# Patient Record
Sex: MALE | Race: WHITE | NOT HISPANIC OR LATINO | Employment: FULL TIME | URBAN - METROPOLITAN AREA
[De-identification: names, ages, dates, MRNs, and addresses within clinical notes are randomized per-mention and may not be internally consistent; named-entity substitution may affect disease eponyms.]

---

## 2023-06-09 ENCOUNTER — TELEPHONE (OUTPATIENT)
Dept: PRIMARY CARE | Facility: CLINIC | Age: 43
End: 2023-06-09

## 2023-06-15 DIAGNOSIS — I10 PRIMARY HYPERTENSION: Primary | ICD-10-CM

## 2023-06-15 RX ORDER — AMLODIPINE BESYLATE 10 MG/1
TABLET ORAL
Qty: 90 TABLET | Refills: 0 | Status: SHIPPED | OUTPATIENT
Start: 2023-06-15 | End: 2023-10-20

## 2023-06-15 RX ORDER — LISINOPRIL AND HYDROCHLOROTHIAZIDE 20; 25 MG/1; MG/1
TABLET ORAL
Qty: 90 TABLET | Refills: 0 | Status: SHIPPED | OUTPATIENT
Start: 2023-06-15 | End: 2023-09-18

## 2023-07-07 DIAGNOSIS — E11.65 TYPE 2 DIABETES MELLITUS WITH HYPERGLYCEMIA, WITHOUT LONG-TERM CURRENT USE OF INSULIN (MULTI): Primary | ICD-10-CM

## 2023-07-07 RX ORDER — METFORMIN HYDROCHLORIDE 500 MG/1
TABLET, EXTENDED RELEASE ORAL
Qty: 360 TABLET | Refills: 0 | Status: SHIPPED | OUTPATIENT
Start: 2023-07-07 | End: 2023-10-20

## 2023-07-22 LAB
ALANINE AMINOTRANSFERASE (SGPT) (U/L) IN SER/PLAS: 12 U/L (ref 10–52)
ALBUMIN (G/DL) IN SER/PLAS: 3.7 G/DL (ref 3.4–5)
ALKALINE PHOSPHATASE (U/L) IN SER/PLAS: 93 U/L (ref 33–120)
ANION GAP IN SER/PLAS: 12 MMOL/L (ref 10–20)
ASPARTATE AMINOTRANSFERASE (SGOT) (U/L) IN SER/PLAS: 10 U/L (ref 9–39)
BASOPHILS (10*3/UL) IN BLOOD BY AUTOMATED COUNT: 0.06 X10E9/L (ref 0–0.1)
BASOPHILS/100 LEUKOCYTES IN BLOOD BY AUTOMATED COUNT: 0.4 % (ref 0–2)
BILIRUBIN TOTAL (MG/DL) IN SER/PLAS: 0.4 MG/DL (ref 0–1.2)
CALCIUM (MG/DL) IN SER/PLAS: 9 MG/DL (ref 8.6–10.3)
CARBON DIOXIDE, TOTAL (MMOL/L) IN SER/PLAS: 29 MMOL/L (ref 21–32)
CHLORIDE (MMOL/L) IN SER/PLAS: 100 MMOL/L (ref 98–107)
CHOLESTEROL (MG/DL) IN SER/PLAS: 131 MG/DL (ref 0–199)
CHOLESTEROL IN HDL (MG/DL) IN SER/PLAS: 38.4 MG/DL
CHOLESTEROL/HDL RATIO: 3.4
CREATININE (MG/DL) IN SER/PLAS: 0.77 MG/DL (ref 0.5–1.3)
EOSINOPHILS (10*3/UL) IN BLOOD BY AUTOMATED COUNT: 0.23 X10E9/L (ref 0–0.7)
EOSINOPHILS/100 LEUKOCYTES IN BLOOD BY AUTOMATED COUNT: 1.7 % (ref 0–6)
ERYTHROCYTE DISTRIBUTION WIDTH (RATIO) BY AUTOMATED COUNT: 14.8 % (ref 11.5–14.5)
ERYTHROCYTE MEAN CORPUSCULAR HEMOGLOBIN CONCENTRATION (G/DL) BY AUTOMATED: 31.3 G/DL (ref 32–36)
ERYTHROCYTE MEAN CORPUSCULAR VOLUME (FL) BY AUTOMATED COUNT: 85 FL (ref 80–100)
ERYTHROCYTES (10*6/UL) IN BLOOD BY AUTOMATED COUNT: 4.5 X10E12/L (ref 4.5–5.9)
GFR MALE: >90 ML/MIN/1.73M2
GLUCOSE (MG/DL) IN SER/PLAS: 102 MG/DL (ref 74–99)
HEMATOCRIT (%) IN BLOOD BY AUTOMATED COUNT: 38.3 % (ref 41–52)
HEMOGLOBIN (G/DL) IN BLOOD: 12 G/DL (ref 13.5–17.5)
IMMATURE GRANULOCYTES/100 LEUKOCYTES IN BLOOD BY AUTOMATED COUNT: 0.6 % (ref 0–0.9)
LDL: 62 MG/DL (ref 0–99)
LEUKOCYTES (10*3/UL) IN BLOOD BY AUTOMATED COUNT: 13.6 X10E9/L (ref 4.4–11.3)
LYMPHOCYTES (10*3/UL) IN BLOOD BY AUTOMATED COUNT: 1.93 X10E9/L (ref 1.2–4.8)
LYMPHOCYTES/100 LEUKOCYTES IN BLOOD BY AUTOMATED COUNT: 14.2 % (ref 13–44)
MONOCYTES (10*3/UL) IN BLOOD BY AUTOMATED COUNT: 0.9 X10E9/L (ref 0.1–1)
MONOCYTES/100 LEUKOCYTES IN BLOOD BY AUTOMATED COUNT: 6.6 % (ref 2–10)
NEUTROPHILS (10*3/UL) IN BLOOD BY AUTOMATED COUNT: 10.43 X10E9/L (ref 1.2–7.7)
NEUTROPHILS/100 LEUKOCYTES IN BLOOD BY AUTOMATED COUNT: 76.5 % (ref 40–80)
PLATELETS (10*3/UL) IN BLOOD AUTOMATED COUNT: 319 X10E9/L (ref 150–450)
POTASSIUM (MMOL/L) IN SER/PLAS: 4.2 MMOL/L (ref 3.5–5.3)
PROTEIN TOTAL: 6.6 G/DL (ref 6.4–8.2)
SODIUM (MMOL/L) IN SER/PLAS: 137 MMOL/L (ref 136–145)
THYROTROPIN (MIU/L) IN SER/PLAS BY DETECTION LIMIT <= 0.05 MIU/L: 1.15 MIU/L (ref 0.44–3.98)
TRIGLYCERIDE (MG/DL) IN SER/PLAS: 153 MG/DL (ref 0–149)
UREA NITROGEN (MG/DL) IN SER/PLAS: 15 MG/DL (ref 6–23)
VLDL: 31 MG/DL (ref 0–40)

## 2023-07-24 LAB
ESTIMATED AVERAGE GLUCOSE FOR HBA1C: 140 MG/DL
HEMOGLOBIN A1C/HEMOGLOBIN TOTAL IN BLOOD: 6.5 %

## 2023-08-02 ENCOUNTER — OFFICE VISIT (OUTPATIENT)
Dept: PRIMARY CARE | Facility: CLINIC | Age: 43
End: 2023-08-02
Payer: COMMERCIAL

## 2023-08-02 VITALS
DIASTOLIC BLOOD PRESSURE: 82 MMHG | WEIGHT: 315 LBS | SYSTOLIC BLOOD PRESSURE: 148 MMHG | BODY MASS INDEX: 40.43 KG/M2 | TEMPERATURE: 98 F | HEIGHT: 74 IN

## 2023-08-02 DIAGNOSIS — R79.89 ABNORMAL CBC: Primary | ICD-10-CM

## 2023-08-02 DIAGNOSIS — I10 PRIMARY HYPERTENSION: ICD-10-CM

## 2023-08-02 DIAGNOSIS — E11.65 TYPE 2 DIABETES MELLITUS WITH HYPERGLYCEMIA, WITHOUT LONG-TERM CURRENT USE OF INSULIN (MULTI): ICD-10-CM

## 2023-08-02 DIAGNOSIS — D72.829 LEUKOCYTOSIS, UNSPECIFIED TYPE: ICD-10-CM

## 2023-08-02 DIAGNOSIS — E78.2 MIXED HYPERLIPIDEMIA: ICD-10-CM

## 2023-08-02 DIAGNOSIS — E66.01 MORBID OBESITY WITH BODY MASS INDEX (BMI) OF 40.0 OR HIGHER (MULTI): ICD-10-CM

## 2023-08-02 PROBLEM — F41.9 ANXIETY AND DEPRESSION: Status: ACTIVE | Noted: 2023-08-02

## 2023-08-02 PROBLEM — R06.83 SNORING: Status: ACTIVE | Noted: 2023-08-02

## 2023-08-02 PROBLEM — D64.9 ANEMIA: Status: ACTIVE | Noted: 2023-08-02

## 2023-08-02 PROBLEM — G47.33 SEVERE OBSTRUCTIVE SLEEP APNEA: Status: ACTIVE | Noted: 2023-08-02

## 2023-08-02 PROBLEM — F32.A ANXIETY AND DEPRESSION: Status: ACTIVE | Noted: 2023-08-02

## 2023-08-02 PROBLEM — R06.89 ABNORMAL BREATH SOUNDS: Status: ACTIVE | Noted: 2023-08-02

## 2023-08-02 PROBLEM — E78.1 HYPERTRIGLYCERIDEMIA: Status: ACTIVE | Noted: 2023-08-02

## 2023-08-02 PROBLEM — M43.6 ACUTE TORTICOLLIS: Status: ACTIVE | Noted: 2023-08-02

## 2023-08-02 PROBLEM — U07.1 COVID-19 VIRUS INFECTION: Status: ACTIVE | Noted: 2023-08-02

## 2023-08-02 PROBLEM — I25.10 CAD (CORONARY ARTERY DISEASE): Status: ACTIVE | Noted: 2023-08-02

## 2023-08-02 PROBLEM — E78.5 HYPERLIPIDEMIA: Status: ACTIVE | Noted: 2023-08-02

## 2023-08-02 PROBLEM — M43.6 ACUTE TORTICOLLIS: Status: RESOLVED | Noted: 2023-08-02 | Resolved: 2023-08-02

## 2023-08-02 PROBLEM — R93.1 AGATSTON CORONARY ARTERY CALCIUM SCORE GREATER THAN 400: Status: ACTIVE | Noted: 2023-08-02

## 2023-08-02 PROBLEM — E55.9 VITAMIN D DEFICIENCY: Status: ACTIVE | Noted: 2023-08-02

## 2023-08-02 PROCEDURE — 3077F SYST BP >= 140 MM HG: CPT | Performed by: NURSE PRACTITIONER

## 2023-08-02 PROCEDURE — 3008F BODY MASS INDEX DOCD: CPT | Performed by: NURSE PRACTITIONER

## 2023-08-02 PROCEDURE — 1036F TOBACCO NON-USER: CPT | Performed by: NURSE PRACTITIONER

## 2023-08-02 PROCEDURE — 3044F HG A1C LEVEL LT 7.0%: CPT | Performed by: NURSE PRACTITIONER

## 2023-08-02 PROCEDURE — 3079F DIAST BP 80-89 MM HG: CPT | Performed by: NURSE PRACTITIONER

## 2023-08-02 PROCEDURE — 99214 OFFICE O/P EST MOD 30 MIN: CPT | Performed by: NURSE PRACTITIONER

## 2023-08-02 RX ORDER — GLIPIZIDE 10 MG/1
10 TABLET, FILM COATED, EXTENDED RELEASE ORAL DAILY
COMMUNITY
End: 2023-11-19

## 2023-08-02 RX ORDER — FERROUS SULFATE 325(65) MG
1 TABLET ORAL DAILY
COMMUNITY
Start: 2021-09-24

## 2023-08-02 RX ORDER — ACETAMINOPHEN 500 MG
1 TABLET ORAL DAILY
COMMUNITY
Start: 2020-12-18

## 2023-08-02 RX ORDER — MELATONIN 3 MG
3 CAPSULE ORAL
COMMUNITY
Start: 2019-05-14

## 2023-08-02 RX ORDER — ESCITALOPRAM OXALATE 10 MG/1
10 TABLET ORAL DAILY
COMMUNITY
End: 2023-12-19

## 2023-08-02 RX ORDER — ATORVASTATIN CALCIUM 80 MG/1
80 TABLET, FILM COATED ORAL NIGHTLY
COMMUNITY
Start: 2023-07-22 | End: 2023-10-24

## 2023-08-02 ASSESSMENT — PATIENT HEALTH QUESTIONNAIRE - PHQ9
1. LITTLE INTEREST OR PLEASURE IN DOING THINGS: NOT AT ALL
SUM OF ALL RESPONSES TO PHQ9 QUESTIONS 1 AND 2: 0
2. FEELING DOWN, DEPRESSED OR HOPELESS: NOT AT ALL

## 2023-08-02 NOTE — PATIENT INSTRUCTIONS
Good to see you today.  Labs reviewed.  Please call for appointment with Hematology [phone]  (Jacquelyn or Lexy)    Plan to follow up with me in 6 months with fasting labs before.  Let me know if you need  anything.

## 2023-08-02 NOTE — PROGRESS NOTES
Subjective   Patient ID: Roby Caldwell is a 42 y.o. male who presents for Follow-up (Review labs).       Recent Results (from the past 1008 hour(s))   Comprehensive Metabolic Panel    Collection Time: 07/22/23  8:44 AM   Result Value Ref Range    Glucose 102 (H) 74 - 99 mg/dL    Sodium 137 136 - 145 mmol/L    Potassium 4.2 3.5 - 5.3 mmol/L    Chloride 100 98 - 107 mmol/L    Bicarbonate 29 21 - 32 mmol/L    Anion Gap 12 10 - 20 mmol/L    Urea Nitrogen 15 6 - 23 mg/dL    Creatinine 0.77 0.50 - 1.30 mg/dL    GFR MALE >90 >90 mL/min/1.73m2    Calcium 9.0 8.6 - 10.3 mg/dL    Albumin 3.7 3.4 - 5.0 g/dL    Alkaline Phosphatase 93 33 - 120 U/L    Total Protein 6.6 6.4 - 8.2 g/dL    AST 10 9 - 39 U/L    Total Bilirubin 0.4 0.0 - 1.2 mg/dL    ALT (SGPT) 12 10 - 52 U/L   Lipid Panel    Collection Time: 07/22/23  8:44 AM   Result Value Ref Range    Cholesterol 131 0 - 199 mg/dL    HDL 38.4 (A) mg/dL    Cholesterol/HDL Ratio 3.4     LDL 62 0 - 99 mg/dL    VLDL 31 0 - 40 mg/dL    Triglycerides 153 (H) 0 - 149 mg/dL   Hemoglobin A1C    Collection Time: 07/22/23  8:44 AM   Result Value Ref Range    Hemoglobin A1C 6.5 (A) %    Estimated Average Glucose 140 MG/DL   CBC and Auto Differential    Collection Time: 07/22/23  8:44 AM   Result Value Ref Range    WBC 13.6 (H) 4.4 - 11.3 x10E9/L    RBC 4.50 4.50 - 5.90 x10E12/L    Hemoglobin 12.0 (L) 13.5 - 17.5 g/dL    Hematocrit 38.3 (L) 41.0 - 52.0 %    MCV 85 80 - 100 fL    MCHC 31.3 (L) 32.0 - 36.0 g/dL    Platelets 319 150 - 450 x10E9/L    RDW 14.8 (H) 11.5 - 14.5 %    Neutrophils % 76.5 40.0 - 80.0 %    Immature Granulocytes %, Automated 0.6 0.0 - 0.9 %    Lymphocytes % 14.2 13.0 - 44.0 %    Monocytes % 6.6 2.0 - 10.0 %    Eosinophils % 1.7 0.0 - 6.0 %    Basophils % 0.4 0.0 - 2.0 %    Neutrophils Absolute 10.43 (H) 1.20 - 7.70 x10E9/L    Lymphocytes Absolute 1.93 1.20 - 4.80 x10E9/L    Monocytes Absolute 0.90 0.10 - 1.00 x10E9/L    Eosinophils Absolute 0.23 0.00 - 0.70 x10E9/L     "Basophils Absolute 0.06 0.00 - 0.10 x10E9/L   TSH with reflex to Free T4 if abnormal    Collection Time: 07/22/23  8:44 AM   Result Value Ref Range    TSH 1.15 0.44 - 3.98 mIU/L           HPI   Saw Cardiologist, Dr. Edouard  had a stress test and  Did fine with the stress test.  Packing lunch rather than eating out as much.  Drinking more water and more juice, less pop.  Using BiPAP regularly and thinks it helps.  Has not seen Hematology.        Review of Systems   All other systems reviewed and are negative.          Objective   /82   Temp 36.7 °C (98 °F)   Ht 1.867 m (6' 1.5\")   Wt (!) 174 kg (384 lb)   BMI 49.98 kg/m²     Physical Exam  Vitals and nursing note reviewed.   Constitutional:       Appearance: He is obese.   HENT:      Head: Normocephalic and atraumatic.      Right Ear: Tympanic membrane, ear canal and external ear normal.      Left Ear: Tympanic membrane, ear canal and external ear normal.      Mouth/Throat:      Pharynx: Oropharynx is clear.   Neck:      Thyroid: No thyroid mass, thyromegaly or thyroid tenderness.   Cardiovascular:      Rate and Rhythm: Normal rate and regular rhythm.      Pulses: Normal pulses.      Heart sounds: Normal heart sounds.   Pulmonary:      Effort: Pulmonary effort is normal.      Breath sounds: Normal breath sounds.   Abdominal:      General: Bowel sounds are normal.      Palpations: Abdomen is soft.   Musculoskeletal:      Cervical back: Normal range of motion and neck supple.   Skin:     General: Skin is warm.   Neurological:      Mental Status: He is alert and oriented to person, place, and time. Mental status is at baseline.   Psychiatric:         Mood and Affect: Mood normal.         Assessment/Plan   Problem List Items Addressed This Visit       Type 2 diabetes mellitus with hyperglycemia, without long-term current use of insulin (CMS/[facility])    Relevant Orders    Hemoglobin A1C    Comprehensive Metabolic Panel    Morbid obesity with body mass index (BMI) of " 40.0 or higher (CMS/HCC)    Leucocytosis    Hypertension    Hyperlipidemia    Relevant Orders    Lipid Panel    Abnormal CBC - Primary    Relevant Orders    Referral to Hematology    CBC and Auto Differential            Veronica Sumner, APRN-CNP

## 2023-09-16 DIAGNOSIS — I10 PRIMARY HYPERTENSION: ICD-10-CM

## 2023-09-18 RX ORDER — LISINOPRIL AND HYDROCHLOROTHIAZIDE 20; 25 MG/1; MG/1
TABLET ORAL
Qty: 90 TABLET | Refills: 1 | Status: SHIPPED | OUTPATIENT
Start: 2023-09-18 | End: 2024-04-16

## 2023-10-18 DIAGNOSIS — E78.2 MIXED HYPERLIPIDEMIA: ICD-10-CM

## 2023-10-18 DIAGNOSIS — I25.10 CORONARY ARTERY DISEASE INVOLVING NATIVE CORONARY ARTERY OF NATIVE HEART, UNSPECIFIED WHETHER ANGINA PRESENT: Primary | ICD-10-CM

## 2023-10-18 DIAGNOSIS — I10 PRIMARY HYPERTENSION: ICD-10-CM

## 2023-10-19 DIAGNOSIS — E11.65 TYPE 2 DIABETES MELLITUS WITH HYPERGLYCEMIA, WITHOUT LONG-TERM CURRENT USE OF INSULIN (MULTI): ICD-10-CM

## 2023-10-20 RX ORDER — AMLODIPINE BESYLATE 10 MG/1
TABLET ORAL
Qty: 90 TABLET | Refills: 0 | Status: SHIPPED | OUTPATIENT
Start: 2023-10-20 | End: 2024-01-19

## 2023-10-20 RX ORDER — METFORMIN HYDROCHLORIDE 500 MG/1
1000 TABLET, EXTENDED RELEASE ORAL
Qty: 360 TABLET | Refills: 0 | Status: SHIPPED | OUTPATIENT
Start: 2023-10-20 | End: 2024-01-19

## 2023-10-24 RX ORDER — ATORVASTATIN CALCIUM 80 MG/1
80 TABLET, FILM COATED ORAL NIGHTLY
Qty: 90 TABLET | Refills: 1 | Status: SHIPPED | OUTPATIENT
Start: 2023-10-24 | End: 2024-04-21

## 2023-11-17 DIAGNOSIS — E11.65 TYPE 2 DIABETES MELLITUS WITH HYPERGLYCEMIA, WITHOUT LONG-TERM CURRENT USE OF INSULIN (MULTI): Primary | ICD-10-CM

## 2023-11-19 RX ORDER — GLIPIZIDE 10 MG/1
TABLET, FILM COATED, EXTENDED RELEASE ORAL
Qty: 180 TABLET | Refills: 0 | Status: SHIPPED | OUTPATIENT
Start: 2023-11-19 | End: 2024-04-16

## 2023-11-30 ENCOUNTER — OFFICE VISIT (OUTPATIENT)
Dept: HEMATOLOGY/ONCOLOGY | Facility: CLINIC | Age: 43
End: 2023-11-30
Payer: COMMERCIAL

## 2023-11-30 VITALS
BODY MASS INDEX: 41.75 KG/M2 | SYSTOLIC BLOOD PRESSURE: 130 MMHG | HEART RATE: 92 BPM | RESPIRATION RATE: 16 BRPM | TEMPERATURE: 98.4 F | DIASTOLIC BLOOD PRESSURE: 89 MMHG | HEIGHT: 73 IN | WEIGHT: 315 LBS | OXYGEN SATURATION: 98 %

## 2023-11-30 DIAGNOSIS — D72.829 LEUKOCYTOSIS, UNSPECIFIED TYPE: Primary | ICD-10-CM

## 2023-11-30 DIAGNOSIS — D64.9 ANEMIA, UNSPECIFIED TYPE: ICD-10-CM

## 2023-11-30 LAB
ALBUMIN SERPL BCP-MCNC: 4 G/DL (ref 3.4–5)
ALP SERPL-CCNC: 102 U/L (ref 33–120)
ALT SERPL W P-5'-P-CCNC: 12 U/L (ref 10–52)
ANION GAP SERPL CALC-SCNC: 11 MMOL/L (ref 10–20)
AST SERPL W P-5'-P-CCNC: 10 U/L (ref 9–39)
BASOPHILS # BLD AUTO: 0.04 X10*3/UL (ref 0–0.1)
BASOPHILS NFR BLD AUTO: 0.3 %
BILIRUB SERPL-MCNC: 0.5 MG/DL (ref 0–1.2)
BUN SERPL-MCNC: 13 MG/DL (ref 6–23)
CALCIUM SERPL-MCNC: 9.5 MG/DL (ref 8.6–10.3)
CHLORIDE SERPL-SCNC: 99 MMOL/L (ref 98–107)
CO2 SERPL-SCNC: 30 MMOL/L (ref 21–32)
CREAT SERPL-MCNC: 0.87 MG/DL (ref 0.5–1.3)
EOSINOPHIL # BLD AUTO: 0.21 X10*3/UL (ref 0–0.7)
EOSINOPHIL NFR BLD AUTO: 1.6 %
ERYTHROCYTE [DISTWIDTH] IN BLOOD BY AUTOMATED COUNT: 14.3 % (ref 11.5–14.5)
ERYTHROCYTE [SEDIMENTATION RATE] IN BLOOD BY WESTERGREN METHOD: 32 MM/H (ref 0–15)
FOLATE SERPL-MCNC: 20.2 NG/ML
GFR SERPL CREATININE-BSD FRML MDRD: >90 ML/MIN/1.73M*2
GLUCOSE SERPL-MCNC: 113 MG/DL (ref 74–99)
HCT VFR BLD AUTO: 40.6 % (ref 41–52)
HGB BLD-MCNC: 12.9 G/DL (ref 13.5–17.5)
IMM GRANULOCYTES # BLD AUTO: 0.07 X10*3/UL (ref 0–0.7)
IMM GRANULOCYTES NFR BLD AUTO: 0.5 % (ref 0–0.9)
IRON SATN MFR SERPL: 14 % (ref 25–45)
IRON SERPL-MCNC: 46 UG/DL (ref 35–150)
LYMPHOCYTES # BLD AUTO: 1.87 X10*3/UL (ref 1.2–4.8)
LYMPHOCYTES NFR BLD AUTO: 14.4 %
MCH RBC QN AUTO: 26.3 PG (ref 26–34)
MCHC RBC AUTO-ENTMCNC: 31.8 G/DL (ref 32–36)
MCV RBC AUTO: 83 FL (ref 80–100)
MONOCYTES # BLD AUTO: 0.77 X10*3/UL (ref 0.1–1)
MONOCYTES NFR BLD AUTO: 5.9 %
NEUTROPHILS # BLD AUTO: 10.02 X10*3/UL (ref 1.2–7.7)
NEUTROPHILS NFR BLD AUTO: 77.3 %
PLATELET # BLD AUTO: 317 X10*3/UL (ref 150–450)
POTASSIUM SERPL-SCNC: 4.2 MMOL/L (ref 3.5–5.3)
PROT SERPL-MCNC: 8.2 G/DL (ref 6.4–8.2)
RBC # BLD AUTO: 4.91 X10*6/UL (ref 4.5–5.9)
SODIUM SERPL-SCNC: 136 MMOL/L (ref 136–145)
TIBC SERPL-MCNC: 318 UG/DL (ref 240–445)
UIBC SERPL-MCNC: 272 UG/DL (ref 110–370)
VIT B12 SERPL-MCNC: 341 PG/ML (ref 211–911)
WBC # BLD AUTO: 13 X10*3/UL (ref 4.4–11.3)

## 2023-11-30 PROCEDURE — 3079F DIAST BP 80-89 MM HG: CPT | Performed by: INTERNAL MEDICINE

## 2023-11-30 PROCEDURE — 85025 COMPLETE CBC W/AUTO DIFF WBC: CPT | Performed by: INTERNAL MEDICINE

## 2023-11-30 PROCEDURE — 83540 ASSAY OF IRON: CPT | Performed by: INTERNAL MEDICINE

## 2023-11-30 PROCEDURE — 3044F HG A1C LEVEL LT 7.0%: CPT | Performed by: INTERNAL MEDICINE

## 2023-11-30 PROCEDURE — 99215 OFFICE O/P EST HI 40 MIN: CPT | Performed by: INTERNAL MEDICINE

## 2023-11-30 PROCEDURE — 88275 CYTOGENETICS 100-300: CPT | Performed by: INTERNAL MEDICINE

## 2023-11-30 PROCEDURE — 82607 VITAMIN B-12: CPT | Performed by: INTERNAL MEDICINE

## 2023-11-30 PROCEDURE — 86038 ANTINUCLEAR ANTIBODIES: CPT | Performed by: INTERNAL MEDICINE

## 2023-11-30 PROCEDURE — 3075F SYST BP GE 130 - 139MM HG: CPT | Performed by: INTERNAL MEDICINE

## 2023-11-30 PROCEDURE — 80053 COMPREHEN METABOLIC PANEL: CPT | Performed by: INTERNAL MEDICINE

## 2023-11-30 PROCEDURE — 85652 RBC SED RATE AUTOMATED: CPT | Performed by: INTERNAL MEDICINE

## 2023-11-30 PROCEDURE — 1036F TOBACCO NON-USER: CPT | Performed by: INTERNAL MEDICINE

## 2023-11-30 PROCEDURE — 36415 COLL VENOUS BLD VENIPUNCTURE: CPT | Performed by: INTERNAL MEDICINE

## 2023-11-30 PROCEDURE — 88291 CYTO/MOLECULAR REPORT: CPT | Performed by: INTERNAL MEDICINE

## 2023-11-30 PROCEDURE — 3008F BODY MASS INDEX DOCD: CPT | Performed by: INTERNAL MEDICINE

## 2023-11-30 PROCEDURE — 82746 ASSAY OF FOLIC ACID SERUM: CPT | Performed by: INTERNAL MEDICINE

## 2023-11-30 ASSESSMENT — PATIENT HEALTH QUESTIONNAIRE - PHQ9
SUM OF ALL RESPONSES TO PHQ9 QUESTIONS 1 AND 2: 0
2. FEELING DOWN, DEPRESSED OR HOPELESS: NOT AT ALL
1. LITTLE INTEREST OR PLEASURE IN DOING THINGS: NOT AT ALL

## 2023-11-30 ASSESSMENT — COLUMBIA-SUICIDE SEVERITY RATING SCALE - C-SSRS
1. IN THE PAST MONTH, HAVE YOU WISHED YOU WERE DEAD OR WISHED YOU COULD GO TO SLEEP AND NOT WAKE UP?: NO
6. HAVE YOU EVER DONE ANYTHING, STARTED TO DO ANYTHING, OR PREPARED TO DO ANYTHING TO END YOUR LIFE?: NO
2. HAVE YOU ACTUALLY HAD ANY THOUGHTS OF KILLING YOURSELF?: NO

## 2023-11-30 ASSESSMENT — PAIN SCALES - GENERAL: PAINLEVEL: 0-NO PAIN

## 2023-11-30 NOTE — PATIENT INSTRUCTIONS
Follow up with Dr. Crowder in 4 months.     Lab appointments are no longer scheduled. Please arrive at least 15 minutes before scheduled appointment time for blood work.

## 2023-12-03 LAB — ANA SER QL HEP2 SUBST: NEGATIVE

## 2023-12-06 LAB
CHROM ANALY OVERALL INTERP-IMP: NORMAL
ELECTRONICALLY COSIGNED BY CYTOGENETICS: NORMAL
ELECTRONICALLY SIGNED BY CYTOGENETICS: NORMAL
STRUCT VAR ISCN NAME: NORMAL

## 2023-12-19 DIAGNOSIS — F34.1 PERSISTENT DEPRESSIVE DISORDER: Primary | ICD-10-CM

## 2023-12-19 RX ORDER — ESCITALOPRAM OXALATE 10 MG/1
TABLET ORAL
Qty: 90 TABLET | Refills: 0 | Status: SHIPPED | OUTPATIENT
Start: 2023-12-19 | End: 2024-03-22

## 2024-01-17 DIAGNOSIS — I10 PRIMARY HYPERTENSION: ICD-10-CM

## 2024-01-17 DIAGNOSIS — E11.65 TYPE 2 DIABETES MELLITUS WITH HYPERGLYCEMIA, WITHOUT LONG-TERM CURRENT USE OF INSULIN (MULTI): ICD-10-CM

## 2024-01-19 DIAGNOSIS — I10 PRIMARY HYPERTENSION: ICD-10-CM

## 2024-01-19 RX ORDER — AMLODIPINE BESYLATE 10 MG/1
TABLET ORAL
Qty: 90 TABLET | Refills: 0 | Status: SHIPPED | OUTPATIENT
Start: 2024-01-19 | End: 2024-01-23

## 2024-01-19 RX ORDER — METFORMIN HYDROCHLORIDE 500 MG/1
1000 TABLET, EXTENDED RELEASE ORAL
Qty: 360 TABLET | Refills: 0 | Status: SHIPPED | OUTPATIENT
Start: 2024-01-19

## 2024-01-23 RX ORDER — AMLODIPINE BESYLATE 10 MG/1
TABLET ORAL
Qty: 90 TABLET | Refills: 0 | Status: SHIPPED | OUTPATIENT
Start: 2024-01-23

## 2024-02-02 ENCOUNTER — OFFICE VISIT (OUTPATIENT)
Dept: PRIMARY CARE | Facility: CLINIC | Age: 44
End: 2024-02-02
Payer: COMMERCIAL

## 2024-02-02 VITALS
HEART RATE: 96 BPM | SYSTOLIC BLOOD PRESSURE: 118 MMHG | TEMPERATURE: 97.7 F | DIASTOLIC BLOOD PRESSURE: 76 MMHG | WEIGHT: 315 LBS | BODY MASS INDEX: 52.88 KG/M2

## 2024-02-02 DIAGNOSIS — E11.65 TYPE 2 DIABETES MELLITUS WITH HYPERGLYCEMIA, WITHOUT LONG-TERM CURRENT USE OF INSULIN (MULTI): ICD-10-CM

## 2024-02-02 DIAGNOSIS — E66.01 CLASS 3 SEVERE OBESITY DUE TO EXCESS CALORIES WITH SERIOUS COMORBIDITY AND BODY MASS INDEX (BMI) OF 50.0 TO 59.9 IN ADULT (MULTI): ICD-10-CM

## 2024-02-02 DIAGNOSIS — E78.2 MIXED HYPERLIPIDEMIA: ICD-10-CM

## 2024-02-02 DIAGNOSIS — I10 PRIMARY HYPERTENSION: Primary | ICD-10-CM

## 2024-02-02 PROCEDURE — 99214 OFFICE O/P EST MOD 30 MIN: CPT | Performed by: NURSE PRACTITIONER

## 2024-02-02 PROCEDURE — 3074F SYST BP LT 130 MM HG: CPT | Performed by: NURSE PRACTITIONER

## 2024-02-02 PROCEDURE — 1036F TOBACCO NON-USER: CPT | Performed by: NURSE PRACTITIONER

## 2024-02-02 PROCEDURE — 3078F DIAST BP <80 MM HG: CPT | Performed by: NURSE PRACTITIONER

## 2024-02-02 PROCEDURE — 3008F BODY MASS INDEX DOCD: CPT | Performed by: NURSE PRACTITIONER

## 2024-02-02 NOTE — PROGRESS NOTES
Subjective   Patient ID: Roby Caldwell is a 43 y.o. male who presents for Follow-up (6 month).    HPI   Was not sleeping well and so took low dose Melatonin  .3 and this helps with better sleep.  Did not do labs before today.  Has some early morning shifts.  Not checking Blood sugar at all.  [place] Instant Breakfast   Normal lunch and regular dinner  Eating a little more protein  Has eaten today.  Denies any side effects to medications.  Has not had symptoms of elevated glucose.    Seeing Cardiology and Hematology    Review of Systems   Constitutional:  Positive for unexpected weight change.   Endocrine: Negative for polydipsia, polyphagia and polyuria.   All other systems reviewed and are negative.      Objective   /76   Pulse 96   Temp 36.5 °C (97.7 °F)   Wt (!) 180 kg (396 lb 6.4 oz)   BMI 52.88 kg/m²     Physical Exam  Vitals and nursing note reviewed.   Constitutional:       Appearance: He is obese.   HENT:      Head: Normocephalic and atraumatic.      Right Ear: Tympanic membrane, ear canal and external ear normal.      Left Ear: Tympanic membrane, ear canal and external ear normal.      Mouth/Throat:      Pharynx: Oropharynx is clear.   Neck:      Thyroid: No thyroid mass, thyromegaly or thyroid tenderness.   Cardiovascular:      Rate and Rhythm: Normal rate and regular rhythm.      Pulses: Normal pulses.      Heart sounds: Normal heart sounds.   Pulmonary:      Effort: Pulmonary effort is normal.      Breath sounds: Normal breath sounds.   Abdominal:      General: Bowel sounds are normal.      Palpations: Abdomen is soft.   Skin:     General: Skin is warm.   Neurological:      Mental Status: He is alert and oriented to person, place, and time.   Psychiatric:         Behavior: Behavior normal.      Comments: Anxious mood         Assessment/Plan   Problem List Items Addressed This Visit             ICD-10-CM    Class 3 severe obesity due to excess calories with serious comorbidity and body  mass index (BMI) of 50.0 to 59.9 in adult (CMS/[facility]) E66.01, Z68.43    Hyperlipidemia E78.5    Hypertension - Primary I10    Type 2 diabetes mellitus with hyperglycemia, without long-term current use of insulin (CMS/[facility]) E11.65

## 2024-02-05 ENCOUNTER — LAB (OUTPATIENT)
Dept: LAB | Facility: LAB | Age: 44
End: 2024-02-05
Payer: COMMERCIAL

## 2024-02-05 DIAGNOSIS — R79.89 ABNORMAL CBC: ICD-10-CM

## 2024-02-05 DIAGNOSIS — E11.65 TYPE 2 DIABETES MELLITUS WITH HYPERGLYCEMIA, WITHOUT LONG-TERM CURRENT USE OF INSULIN (MULTI): ICD-10-CM

## 2024-02-05 DIAGNOSIS — E78.2 MIXED HYPERLIPIDEMIA: ICD-10-CM

## 2024-02-05 PROBLEM — I25.10 CORONARY ARTERY CALCIFICATION: Status: ACTIVE | Noted: 2024-02-05

## 2024-02-05 PROBLEM — E66.813 CLASS 3 SEVERE OBESITY DUE TO EXCESS CALORIES WITH SERIOUS COMORBIDITY AND BODY MASS INDEX (BMI) OF 50.0 TO 59.9 IN ADULT: Status: ACTIVE | Noted: 2023-08-02

## 2024-02-05 PROBLEM — R60.0 BILATERAL EDEMA OF LOWER EXTREMITY: Status: ACTIVE | Noted: 2024-02-05

## 2024-02-05 PROBLEM — I25.84 CORONARY ARTERY CALCIFICATION: Status: ACTIVE | Noted: 2024-02-05

## 2024-02-05 LAB
ALBUMIN SERPL BCP-MCNC: 3.5 G/DL (ref 3.4–5)
ALP SERPL-CCNC: 92 U/L (ref 33–120)
ALT SERPL W P-5'-P-CCNC: 12 U/L (ref 10–52)
ANION GAP SERPL CALC-SCNC: 11 MMOL/L (ref 10–20)
AST SERPL W P-5'-P-CCNC: 10 U/L (ref 9–39)
BASOPHILS # BLD AUTO: 0.05 X10*3/UL (ref 0–0.1)
BASOPHILS NFR BLD AUTO: 0.4 %
BILIRUB SERPL-MCNC: 0.4 MG/DL (ref 0–1.2)
BUN SERPL-MCNC: 15 MG/DL (ref 6–23)
CALCIUM SERPL-MCNC: 8.7 MG/DL (ref 8.6–10.3)
CHLORIDE SERPL-SCNC: 102 MMOL/L (ref 98–107)
CHOLEST SERPL-MCNC: 129 MG/DL (ref 0–199)
CHOLESTEROL/HDL RATIO: 3.2
CO2 SERPL-SCNC: 30 MMOL/L (ref 21–32)
CREAT SERPL-MCNC: 0.75 MG/DL (ref 0.5–1.3)
EGFRCR SERPLBLD CKD-EPI 2021: >90 ML/MIN/1.73M*2
EOSINOPHIL # BLD AUTO: 0.2 X10*3/UL (ref 0–0.7)
EOSINOPHIL NFR BLD AUTO: 1.6 %
ERYTHROCYTE [DISTWIDTH] IN BLOOD BY AUTOMATED COUNT: 15.1 % (ref 11.5–14.5)
GLUCOSE SERPL-MCNC: 126 MG/DL (ref 74–99)
HCT VFR BLD AUTO: 37.2 % (ref 41–52)
HDLC SERPL-MCNC: 40.5 MG/DL
HGB BLD-MCNC: 11.5 G/DL (ref 13.5–17.5)
IMM GRANULOCYTES # BLD AUTO: 0.08 X10*3/UL (ref 0–0.7)
IMM GRANULOCYTES NFR BLD AUTO: 0.7 % (ref 0–0.9)
LDLC SERPL CALC-MCNC: 70 MG/DL
LYMPHOCYTES # BLD AUTO: 2.07 X10*3/UL (ref 1.2–4.8)
LYMPHOCYTES NFR BLD AUTO: 16.8 %
MCH RBC QN AUTO: 26.2 PG (ref 26–34)
MCHC RBC AUTO-ENTMCNC: 30.9 G/DL (ref 32–36)
MCV RBC AUTO: 85 FL (ref 80–100)
MONOCYTES # BLD AUTO: 0.79 X10*3/UL (ref 0.1–1)
MONOCYTES NFR BLD AUTO: 6.4 %
NEUTROPHILS # BLD AUTO: 9.11 X10*3/UL (ref 1.2–7.7)
NEUTROPHILS NFR BLD AUTO: 74.1 %
NON HDL CHOLESTEROL: 89 MG/DL (ref 0–149)
NRBC BLD-RTO: 0 /100 WBCS (ref 0–0)
PLATELET # BLD AUTO: 294 X10*3/UL (ref 150–450)
POTASSIUM SERPL-SCNC: 4.2 MMOL/L (ref 3.5–5.3)
PROT SERPL-MCNC: 6.4 G/DL (ref 6.4–8.2)
RBC # BLD AUTO: 4.39 X10*6/UL (ref 4.5–5.9)
SODIUM SERPL-SCNC: 139 MMOL/L (ref 136–145)
TRIGL SERPL-MCNC: 94 MG/DL (ref 0–149)
VLDL: 19 MG/DL (ref 0–40)
WBC # BLD AUTO: 12.3 X10*3/UL (ref 4.4–11.3)

## 2024-02-05 PROCEDURE — 83036 HEMOGLOBIN GLYCOSYLATED A1C: CPT

## 2024-02-05 PROCEDURE — 80053 COMPREHEN METABOLIC PANEL: CPT

## 2024-02-05 PROCEDURE — 80061 LIPID PANEL: CPT

## 2024-02-05 PROCEDURE — 36415 COLL VENOUS BLD VENIPUNCTURE: CPT

## 2024-02-05 PROCEDURE — 85025 COMPLETE CBC W/AUTO DIFF WBC: CPT

## 2024-02-05 ASSESSMENT — ENCOUNTER SYMPTOMS
UNEXPECTED WEIGHT CHANGE: 1
POLYDIPSIA: 0
POLYPHAGIA: 0

## 2024-02-05 NOTE — PATIENT INSTRUCTIONS
Do labs when fasting 8 hours/hydrated and no supplements.  WORK ON WEIGHT SERIOUSLY.  Consider bariatric procedure/Semiglutide  Continue with current medications.  Follow up based on labs.

## 2024-02-05 NOTE — PROGRESS NOTES
[facility]    Patient Name: Roby Caldwell  Patient : 1980      Scribe Attestation  By signing my name below, IViolette Scribe   attest that this documentation has been prepared under the direction and in the presence of Geovanny Edouard DO.      Reason for visit:  This is a 43-year-old male here for follow-up regarding his history of coronary artery calcification, hypertension, dyslipidemia, diabetes mellitus, lower extremity edema, and severe obesity.      HPI:  This is a 43-year-old male here for follow-up regarding his history of coronary artery calcification, hypertension, dyslipidemia, diabetes mellitus, lower extremity edema, and severe obesity.  The patient was last evaluated by me in 2023.  At that visit he was doing reasonably well and asked that he follow-up in 1 year.  CMP done 2024 showed normal serum sodium and potassium with a serum creatinine of 0.75, normal ALT/AST, CBC showed a hemoglobin of 11.5.  Lipid panel done in 2024 showed an LDL cholesterol of 70 and triglycerides of 94 while on atorvastatin 80 mg daily. ECG done today showed sinus rhythm with a heart rate of 92 bpm.  The patient reports that he has been feeling generally well from the cardiac standpoint. He denies any new chest pain, shortness of breath, palpitations and lightheadedness. He states that he does not monitor his blood pressure at home. He states that he takes all of his medications as prescribed. During my exam, he was resting comfortably on the exam table.            Assessment/Plan:   1. Coronary artery calcification  The patient has a history of coronary calcification with a total score of 433.63.   ECG done today showed sinus rhythm with a heart rate of 92 bpm.  He denies anginal chest discomfort.  Blood pressure appears moderately controlled on exam today.  He should continue current antihypertensive medications.  Echocardiogram done  1/23/2023 showed normal left ventricular systolic function with an ejection fraction of 60 to 65%, normal right ventricular systolic function, no significant valve abnormalities.   Recent lab works as noted in the HPI.  Lipid panel done in February 2024 showed an LDL cholesterol of 70 and triglycerides of 94 while on atorvastatin 80 mg daily.   Please see lifestyle recommendations below.  Follow up in 1 year and sooner if necessary.      2. Hypertension  Patient has a history of hypertension which appears moderately controlled on exam today.  He should continue his current antihypertensive medications and monitor his blood pressure at home.     3. Dyslipidemia  Lipid panel done in February 2024 showed an LDL cholesterol of 70 and triglycerides of 94 while on atorvastatin 80 mg daily.   Please see lifestyle recommendations below.     4. Diabetes Mellitus  Hemoglobin A1c done in July 2023 was 6.5%.  Medication management as per PCP.     5. Bilateral lower extremity edema  The patient has trace to 1+  pitting bilateral lower extremity edema on exam today.  I discussed with him the importance of following a low-sodium heart healthy diet as well as weight loss, wearing compression stockings and elevating legs when seated.     6. Severe obesity  Please see lifestyle recommendations below.      7. Obstructive sleep apnea  Stable on new CPAP machine.  Management as per PCP/Sleep Medicine      Order:   Follow-up in 1 year.    Lifestyle Recommendations  I recommend a whole-food plant-based diet, an eating pattern that encourages the consumption of unrefined plant foods (such as fruits, vegetables, tubers, whole grains, legumes, nuts and seeds) and discourages meats, dairy products, eggs and processed foods.     The AHA/ACC recommends that the patient consume a dietary pattern that emphasizes intake of vegetables, fruits, and whole grains; includes low-fat dairy products, poultry, fish, legumes, non-tropical vegetable oils,  and nuts; and limits intake of sodium, sweets, sugar-sweetened beverages, and red meats.  Adapt this dietary pattern to appropriate calorie requirements (a 500-750 kcal/day deficit to loose weight), personal and cultural food preferences, and nutrition therapy for other medical conditions (including diabetes).  Achieve this pattern by following plans such as the [place] Mediterranean, DASH dietary pattern, or AHA diet.     Engage in 2 hours and 30 minutes per week of moderate-intensity physical activity, or 1 hour and 15 minutes (75 minutes) per week of vigorous-intensity aerobic physical activity, or an equivalent combination of moderate and vigorous-intensity aerobic physical activity. Aerobic activity should be performed in episodes of at least 10 minutes preferably spread throughout the week.     Adhering to a heart healthy diet, regular exercise habits, avoidance of tobacco products, and maintenance of a healthy weight are crucial components of their heart disease risk reduction.     Any positive review of systems not specifically addressed in the office visit today should be evaluated and treated by the patients primary care physician or in an emergency department if necessary     Patient was notified that results from ordered tests will be called to the patient if it changes current management; it will otherwise be discussed at a future appointment and available on  [facility].     Thank you for allowing me to participate in the care of this patient.        This document was generated using the assistance of voice recognition software. If there are any errors of spelling, grammar, syntax, or meaning; please feel free to contact me directly for clarification.    Past Medical History:  He has a past medical history of Diabetes mellitus (CMS/HCC), Hypertension, and Unspecified lack of expected normal physiological development in childhood (06/05/2017).    Past Surgical History:  He has a past surgical history that  "includes Cyst Removal; Dental surgery; and LASIK (Bilateral).      Social History:  He reports that he has never smoked. He has never used smokeless tobacco. He reports that he does not drink alcohol and does not use drugs.    Family History:  Family History   Problem Relation Name Age of Onset    Hypothyroidism Mother      Graves' disease Mother      Prostate cancer Father Fabrizio Caldwell     Hypertension Father Fabrizio Caldwell     Cancer Father Fabrizio Caldwell     No Known Problems Brother          Allergies:  Patient has no known allergies.    Outpatient Medications:  Current Outpatient Medications   Medication Instructions    amLODIPine (Norvasc) 10 mg tablet TAKE ONE TABLET BY MOUTH EVERY DAY    atorvastatin (LIPITOR) 80 mg, oral, Nightly    cholecalciferol (Vitamin D-3) 50 mcg (2,000 unit) capsule 1 capsule, oral, Daily    escitalopram (Lexapro) 10 mg tablet TAKE ONE TABLET BY MOUTH EVERY DAY AFTER EATING    ferrous sulfate 325 (65 Fe) MG tablet 1 tablet, oral, Daily    fish oil concentrate (Omega-3) 120-180 mg capsule 1,000 mg, oral    glipiZIDE XL (Glucotrol XL) 10 mg 24 hr tablet TAKE 1 TABLET BY MOUTH DAILY WITH BREAKFAST AND DINNER    lisinopriL-hydrochlorothiazide 20-25 mg tablet TAKE ONE TABLET BY MOUTH DAILY    melatonin 3 mg, oral    metFORMIN XR (GLUCOPHAGE-XR) 1,000 mg, oral, 2 times daily with meals        ROS:  A 14 point review of systems was done and is negative other than as stated in HPI    Vitals:      10/25/2022    10:29 AM 1/5/2023     3:13 PM 1/24/2023     9:56 AM 2/13/2023     2:05 PM 8/2/2023    11:07 AM 11/30/2023    10:44 AM 2/2/2024    11:35 AM   Vitals   Systolic 140 132 132 122 148 130 118   Diastolic 78 74 82 78 82 89 76   Heart Rate  87  91  92 96   Temp   36.8 °C (98.3 °F)  36.7 °C (98 °F) 36.9 °C (98.4 °F) 36.5 °C (97.7 °F)   Resp    18  16    Height (in)  1.829 m (6')  1.829 m (6') 1.867 m (6' 1.5\") 1.844 m (6' 0.6\")     Weight (lb)  385 386 332.25 384 388.01 396.4   BMI  52.22 kg/m2 " 52.35 kg/m2 45.06 kg/m2 49.98 kg/m2 51.76 kg/m2 52.88 kg/m2   BSA (m2)  2.98 m2 2.98 m2 2.77 m2 3 m2 3 m2 3.04 m2       Significant value        Physical Exam:   Constitutional: Cooperative, in no acute distress, alert, appears stated age.   Skin: Skin color, texture, turgor normal. No rashes or lesions.   Head: Normocephalic. No masses, lesions, tenderness or abnormalities   Eyes: Extraocular movements are grossly intact.   Mouth and throat: Mucous membranes moist   Neck: Neck supple, no carotid bruits, no JVD   Respiratory: Lungs clear to auscultation, no wheezing or rhonchi, no use of accessory muscles   Chest wall: No scars, normal excursion with respiration   Cardiovascular: Regular rhythm, no murmur  Gastrointestinal: Abdomen soft, nontender. Bowel sounds normal. Morbidly obese.  Musculoskeletal: Strength equal in upper extremities   Extremities: Trace to 1+ pitting edema   Neurologic: Sensation grossly intact, alert and oriented x3    Intake/Output:   No intake/output data recorded.    Outpatient Medications  Current Outpatient Medications on File Prior to Visit   Medication Sig Dispense Refill    amLODIPine (Norvasc) 10 mg tablet TAKE ONE TABLET BY MOUTH EVERY DAY 90 tablet 0    atorvastatin (Lipitor) 80 mg tablet Take 1 tablet (80 mg) by mouth once daily at bedtime. 90 tablet 1    cholecalciferol (Vitamin D-3) 50 mcg (2,000 unit) capsule Take 1 capsule (50 mcg) by mouth once daily.      escitalopram (Lexapro) 10 mg tablet TAKE ONE TABLET BY MOUTH EVERY DAY AFTER EATING 90 tablet 0    ferrous sulfate 325 (65 Fe) MG tablet Take 1 tablet by mouth once daily.      fish oil concentrate (Omega-3) 120-180 mg capsule Take 1 capsule (1,000 mg) by mouth.      glipiZIDE XL (Glucotrol XL) 10 mg 24 hr tablet TAKE 1 TABLET BY MOUTH DAILY WITH BREAKFAST AND DINNER 180 tablet 0    lisinopriL-hydrochlorothiazide 20-25 mg tablet TAKE ONE TABLET BY MOUTH DAILY 90 tablet 1    melatonin 3 mg capsule Take 3 mg by mouth.       metFORMIN  mg 24 hr tablet take 2 tablets by mouth twice daily with morning and evening meals 360 tablet 0     No current facility-administered medications on file prior to visit.       Labs: (past 26 weeks)  Recent Results (from the past 4368 hour(s))   CBC and Auto Differential    Collection Time: 11/30/23 11:40 AM   Result Value Ref Range    WBC 13.0 (H) 4.4 - 11.3 x10*3/uL    RBC 4.91 4.50 - 5.90 x10*6/uL    Hemoglobin 12.9 (L) 13.5 - 17.5 g/dL    Hematocrit 40.6 (L) 41.0 - 52.0 %    MCV 83 80 - 100 fL    MCH 26.3 26.0 - 34.0 pg    MCHC 31.8 (L) 32.0 - 36.0 g/dL    RDW 14.3 11.5 - 14.5 %    Platelets 317 150 - 450 x10*3/uL    Neutrophils % 77.3 40.0 - 80.0 %    Immature Granulocytes %, Automated 0.5 0.0 - 0.9 %    Lymphocytes % 14.4 13.0 - 44.0 %    Monocytes % 5.9 2.0 - 10.0 %    Eosinophils % 1.6 0.0 - 6.0 %    Basophils % 0.3 0.0 - 2.0 %    Neutrophils Absolute 10.02 (H) 1.20 - 7.70 x10*3/uL    Immature Granulocytes Absolute, Automated 0.07 0.00 - 0.70 x10*3/uL    Lymphocytes Absolute 1.87 1.20 - 4.80 x10*3/uL    Monocytes Absolute 0.77 0.10 - 1.00 x10*3/uL    Eosinophils Absolute 0.21 0.00 - 0.70 x10*3/uL    Basophils Absolute 0.04 0.00 - 0.10 x10*3/uL   Comprehensive Metabolic Panel    Collection Time: 11/30/23 11:40 AM   Result Value Ref Range    Glucose 113 (H) 74 - 99 mg/dL    Sodium 136 136 - 145 mmol/L    Potassium 4.2 3.5 - 5.3 mmol/L    Chloride 99 98 - 107 mmol/L    Bicarbonate 30 21 - 32 mmol/L    Anion Gap 11 10 - 20 mmol/L    Urea Nitrogen 13 6 - 23 mg/dL    Creatinine 0.87 0.50 - 1.30 mg/dL    eGFR >90 >60 mL/min/1.73m*2    Calcium 9.5 8.6 - 10.3 mg/dL    Albumin 4.0 3.4 - 5.0 g/dL    Alkaline Phosphatase 102 33 - 120 U/L    Total Protein 8.2 6.4 - 8.2 g/dL    AST 10 9 - 39 U/L    Bilirubin, Total 0.5 0.0 - 1.2 mg/dL    ALT 12 10 - 52 U/L   Iron and TIBC    Collection Time: 11/30/23 11:40 AM   Result Value Ref Range    Iron 46 35 - 150 ug/dL    UIBC 272 110 - 370 ug/dL    TIBC 318 240 -  445 ug/dL    % Saturation 14 (L) 25 - 45 %   Vitamin B12    Collection Time: 11/30/23 11:40 AM   Result Value Ref Range    Vitamin B12 341 211 - 911 pg/mL   Folate    Collection Time: 11/30/23 11:40 AM   Result Value Ref Range    Folate, Serum 20.2 >5.0 ng/mL   BCR/ABL1, FISH    Collection Time: 11/30/23 11:40 AM   Result Value Ref Range    ISCN       FISH NEGATIVE for BCR::ABL1 rearrangement/translocation    nuc ned(ABL1,BCR)x2[250]      Cytogenetics Interpretation       Results Summary  Fluorescence in situ hybridization (FISH) analysis showed NORMAL results with no evidence of BCR::ABL1 rearrangement/9;22 translocation.    Anomaly Result (%) Reference range (%)   BCR::ABL1 0.0% (0.2% or greater)   METHODOLOGY   The dual color, dual fusion BCR (22q11.2) and ABL1 (9q34) probes ([employer], [place], [place]) were used in this interphase FISH assay. This test should not be used for the detection of minimal residual disease.  Number of nuclei scored: 250  Number of techs: 2    ANALYTE SPECIFIC REAGENT (ASR) DISCLAIMER  This FISH test was developed and its performance characteristics determined by the [facility].  It has not been cleared or approved by the U.S. Food and Drug Administration.  The FDA has determined that such clearance or approval is not necessary.  This test is used for clinical purposes.  It should not be regarded as investigational or for research.  This laboratory is certified under the Clinical Laboratory Improvement Amendments (CLIA) as qualified to perform high complexity laboratory testing.      Electronically signed and reported by       William Hu MD        Electronically co-signed by       Pierre Newman MD       Sedimentation Rate    Collection Time: 11/30/23 11:40 AM   Result Value Ref Range    Sedimentation Rate 32 (H) 0 - 15 mm/h   BARB with Reflex to AKASH    Collection Time: 11/30/23 11:40 AM   Result Value Ref Range    BARB Negative Negative    Comprehensive Metabolic Panel    Collection Time: 02/05/24  8:02 AM   Result Value Ref Range    Glucose 126 (H) 74 - 99 mg/dL    Sodium 139 136 - 145 mmol/L    Potassium 4.2 3.5 - 5.3 mmol/L    Chloride 102 98 - 107 mmol/L    Bicarbonate 30 21 - 32 mmol/L    Anion Gap 11 10 - 20 mmol/L    Urea Nitrogen 15 6 - 23 mg/dL    Creatinine 0.75 0.50 - 1.30 mg/dL    eGFR >90 >60 mL/min/1.73m*2    Calcium 8.7 8.6 - 10.3 mg/dL    Albumin 3.5 3.4 - 5.0 g/dL    Alkaline Phosphatase 92 33 - 120 U/L    Total Protein 6.4 6.4 - 8.2 g/dL    AST 10 9 - 39 U/L    Bilirubin, Total 0.4 0.0 - 1.2 mg/dL    ALT 12 10 - 52 U/L   Lipid Panel    Collection Time: 02/05/24  8:02 AM   Result Value Ref Range    Cholesterol 129 0 - 199 mg/dL    HDL-Cholesterol 40.5 mg/dL    Cholesterol/HDL Ratio 3.2     LDL Calculated 70 <=99 mg/dL    VLDL 19 0 - 40 mg/dL    Triglycerides 94 0 - 149 mg/dL    Non HDL Cholesterol 89 0 - 149 mg/dL   CBC and Auto Differential    Collection Time: 02/05/24  8:02 AM   Result Value Ref Range    WBC 12.3 (H) 4.4 - 11.3 x10*3/uL    nRBC 0.0 0.0 - 0.0 /100 WBCs    RBC 4.39 (L) 4.50 - 5.90 x10*6/uL    Hemoglobin 11.5 (L) 13.5 - 17.5 g/dL    Hematocrit 37.2 (L) 41.0 - 52.0 %    MCV 85 80 - 100 fL    MCH 26.2 26.0 - 34.0 pg    MCHC 30.9 (L) 32.0 - 36.0 g/dL    RDW 15.1 (H) 11.5 - 14.5 %    Platelets 294 150 - 450 x10*3/uL    Neutrophils % 74.1 40.0 - 80.0 %    Immature Granulocytes %, Automated 0.7 0.0 - 0.9 %    Lymphocytes % 16.8 13.0 - 44.0 %    Monocytes % 6.4 2.0 - 10.0 %    Eosinophils % 1.6 0.0 - 6.0 %    Basophils % 0.4 0.0 - 2.0 %    Neutrophils Absolute 9.11 (H) 1.20 - 7.70 x10*3/uL    Immature Granulocytes Absolute, Automated 0.08 0.00 - 0.70 x10*3/uL    Lymphocytes Absolute 2.07 1.20 - 4.80 x10*3/uL    Monocytes Absolute 0.79 0.10 - 1.00 x10*3/uL    Eosinophils Absolute 0.20 0.00 - 0.70 x10*3/uL    Basophils Absolute 0.05 0.00 - 0.10 x10*3/uL       ECG  No results found for this or any previous visit (from the past  4464 hour(s)).    Echocardiogram  No results found for this or any previous visit from the past 1095 days.      CV Studies:  EKG:No results found for this or any previous visit (from the past 4464 hour(s)).  Echocardiogram:   Echocardiogram     [place] [id number]  Phone [phone] Fax [phone]    TRANSTHORACIC ECHOCARDIOGRAM REPORT      Patient Name:     BETINA KATHERINE Carter Physician:  [id number] Maricel BERNAL MD  Study Date:       1/23/2023       Referring           MARISSA ORDONEZ  Physician:  MRN/PID:          [id number]        PCP:  Accession/Order#: [id number]    [facility] Lab  Location:  YOB: 1980       Fellow:  Gender:           M               Nurse:              Lluvia Rincon RN PRN  Admit Date:                       Sonographer:        Liza Brice  Admission Status: Outpatient      Additional Staff:  Height:           182.88 cm       CC Report to:  Weight:           174.63 kg       Study Type:         Echocardiogram  BSA:              2.81 m2  Blood Pressure: 132 /74 mmHg    Diagnosis/ICD: I25.10-Atherosclerotic heart disease of native coronary artery  without angina pectoris; I10-Essential (primary) hypertension  Indication:    Coronary artery calcification, HTN  Procedure/CPT: Echo Complete w Full [id number]  Study Detail: The following Echo studies were performed: 2D, M-Mode, Doppler and  color flow. Technically challenging study due to poor acoustic  windows and body habitus. Optison used as a contrast agent for  endocardial border definition.      PHYSICIAN INTERPRETATION:  Left Ventricle: Left ventricular systolic function is normal, with an estimated ejection fraction of 60-65%. There are no regional wall motion abnormalities. The left ventricular cavity size is normal. Left ventricular diastolic filling was indeterminate.  Left Atrium: The left  atrium is moderately dilated.  Right Ventricle: The right ventricle is normal in size. There is normal right ventricular global systolic function.  Right Atrium: The right atrium is normal in size.  Aortic Valve: The aortic valve was not well visualized. There is no evidence of aortic valve regurgitation. The peak instantaneous gradient of the aortic valve is 12.5 mmHg. The mean gradient of the aortic valve is 7.4 mmHg.  Mitral Valve: The mitral valve is normal in structure. There is no evidence of mitral valve regurgitation.  Tricuspid Valve: The tricuspid valve is structurally normal. There is trace tricuspid regurgitation. The Doppler estimated RVSP is mildly elevated at 39.5 mmHg.  Pulmonic Valve: The pulmonic valve is not well visualized. The pulmonic valve regurgitation was not well visualized.  Pericardium: There is no pericardial effusion noted.  Aorta: The aortic root is normal.      CONCLUSIONS:  1. Left ventricular systolic function is normal with a 60-65% estimated ejection fraction.  2. The left atrium is moderately dilated.  3. Mildly elevated RVSP.    QUANTITATIVE DATA SUMMARY:  2D MEASUREMENTS:  Normal Ranges:  LAs: 3.56 cm (2.7-4.0cm)    LA VOLUME:  Normal Ranges:  LA Vol A4C:        106.0 ml   (22+/-6mL/m2)  LA Vol A2C:        89.8 ml  LA Vol BP:         98.3 ml  LA Vol Index A4C:  37.7 ml/m2  LA Vol Index A2C:  31.9 ml/m2  LA Vol Index BP:   34.9 ml/m2  LA Area A4C:       28.9 cm2  LA Area A2C:       26.4 cm2  LA Major Axis A4C: 6.7 cm  LA Major Axis A2C: 6.6 cm  LA Vol A4C:        98.5 ml  LA Vol A2C:        85.7 ml    RA VOLUME BY A/L METHOD:  Normal Ranges:  RA Area A4C: 18.4 cm2    AORTA MEASUREMENTS:  Normal Ranges:  Asc Ao, d: 3.30 cm (2.1-3.4cm)    LV SYSTOLIC FUNCTION BY 2D PLANIMETRY (MOD):  Normal Ranges:  EF-A4C View: 60.8 % (>=55%)  EF-A2C View: 73.1 %  EF-Biplane:  68.0 %    LV DIASTOLIC FUNCTION:  Normal Ranges:  MV Peak E:    1.15 m/s    (0.7-1.2 m/s)  MV Peak A:    0.71 m/s     (0.42-0.7 m/s)  E/A Ratio:    1.63        (1.0-2.2)  MV e'         0.10 m/s    (>8.0)  MV lateral e' 0.09 m/s  MV medial e'  0.10 m/s  MV A Dur:     139.87 msec  E/e' Ratio:   12.14       (<8.0)    MITRAL VALVE:  Normal Ranges:  MV DT: 158 msec (150-240msec)    AORTIC VALVE:  Normal Ranges:  AoV Vmax:                1.77 m/s  (<=1.7m/s)  AoV Peak P.5 mmHg (<20mmHg)  AoV Mean P.4 mmHg  (1.7-11.5mmHg)  LVOT Max Uriel:            1.60 m/s  (<=1.1m/s)  AoV VTI:                 33.17 cm  (18-25cm)  LVOT VTI:                29.04 cm  LVOT Diameter:           1.91 cm   (1.8-2.4cm)  AoV Area, VTI:           2.51 cm2  (2.5-5.5cm2)  AoV Area,Vmax:           2.59 cm2  (2.5-4.5cm2)  AoV Dimensionless Index: 0.88    RIGHT VENTRICLE:  RV 1   4.0 cm  RV 2   2.8 cm  RV 3   8.0 cm  TAPSE: 30.5 mm  RV s'  0.12 m/s    TRICUSPID VALVE/RVSP:  Normal Ranges:  Peak TR Velocity: 2.47 m/s  RV Syst Pressure: 39.5 mmHg (< 30mmHg)  TV E Vmax:        0.52 m/s  (0.3-0.7m/s)  TV A Vmax:        0.54 m/s  IVC Diam:         2.99 cm  TV e'             0.1 m/s    AORTA:  Asc Ao Diam 3.31 cm      [id number] Maricel Wells MD  Electronically signed on 2023 at 11:41:02 AM         Final     Stress Testing IMGRESULT([id number]):   NM CARDIAC STRESS REST (MYOCARDIAL PERFUSION MIBI) 2023    Narrative  MRN: [id number]  Patient Name: BETINA BERNAL    STUDY:  CARDIAC STRESS/REST INJECTION;  2023 11:35 am    INDICATION:  Elevated calcium score  E78.5: Hyperlipidemia I10: Hypertension  E66.01: Morbid obesity with body mass index (BMI) of 40.0 or higher.    COMPARISON:  None.    ACCESSION NUMBER(S):  [id number]    ORDERING CLINICIAN:  MARISSA ORDONEZ    TECHNIQUE:  DIVISION OF NUCLEAR MEDICINE  STRESS MYOCARDIAL PERFUSION SCAN, ONE DAY PROTOCOL    The patient received an intravenous dose of  10.8 mCi of Tc-99m  tetrofosmin and resting emission tomographic (SPECT) images of the  myocardium were acquired. The patient then  exercised via treadmill  stress to  85 % of MPHR and achieved  7 METS. At peak stress  33.8  mCi of Tc-99m tetrofosmin were administered and stress phase SPECT  images of the myocardium were then acquired. These included ECG-gated  images to assess and quantify ventricular function.    FINDINGS:    There is normal perfusion in all major segments. There is normal wall  motion and normal left ventricular function . The gated ejection  fraction is  61%(Normal over 50%).    Impression  Normal exercise stress myocardial perfusion imaging.    Cardiac Catheterization: No results found for this or any previous visit from the past 1825 days.  No results found for this or any previous visit from the past 3650 days.     Cardiac Scoring:   CT HEART CALCIUM SCORING WO IV CONTRAST 05/26/2022    Narrative  MRN: [id number]  Patient Name: BETINA BERNAL    STUDY:  CT CARDIAC SCORING;  5/26/2022 8:07 am    INDICATION:  HLD; Hypertriglyceridemia; Class 3 Obesity; CAD per CT Cardiac Score  5/25/17  E78.5: Hyperlipidemia I25.10: CAD (coronary artery disease)  E66.01: Class 3 severe obesity due to excess calories with serious  comorbidity and body mass index (BMI) of.    COMPARISON:  05/26/2017    ACCESSION NUMBER(S):  [id number]    ORDERING CLINICIAN:  GERARDO FORBES    TECHNIQUE:  Using prospective ECG gating, CT scan of the coronary arteries was  performed without intravenous contrast. Coronary calcium scoring  was  performed according to the method of Agatston.    FINDINGS:  The score and distribution of calcium in the coronary arteries is as  follows:    LM 36.44 ,  .44,  LCx 6.02,  RCA 87.73,    Total 433.63    The visualized mid/lower ascending thoracic aorta measures 3.4 cm in  diameter. The heart is normal in size. No pericardial effusion is  present.    No gross evidence of mediastinal or hilar lymphadenopathy or masses  is identified. The visualized segments of the lungs are normally  expanded.    The visualized  "subdiaphragmatic structures appear intact.    Impression  1. Coronary artery calcium score of 433.63*. This is compared to a  prior calcium score of 122.22 On 05/26/2017.    *Coronary artery calcium scoring may be helpful in predicting the  risk for future coronary heart disease events.  According to the  American College of Cardiology Foundation Clinical Expert Consensus  Task Force, such testing provides important prognostic information in  patients with more than one coronary heart disease risk factor. The  coronary artery calcium score correlates with the annual risk of a  non-fatal myocardial infarction or coronary heart disease death.    Coronary artery score            Annual Risk    0-99                             0.4%  100-399                        1.3%  >400                            2.4%    These three \"breakpoints\" correspond to lower, intermediate and high  risk states for future coronary events.  Such information should be  used, along with appropriate clinical judgment, to make decisions  regarding the intensity of risk factor management strategies to treat  blood lipids and to modify other non-lipid coronary risk factors.    Reference: [place] P et al. Circulation.  2007; 115:402-426    AAA : No results found for this or any previous visit from the past 1825 days.    OTHER: No results found for this or any previous visit from the past 1825 days.    LAST IMAGING RESULTS  ELECTROCARDIOGRAM RHYTHM STRIP  Ordered by an unspecified provider.    Problem List Items Addressed This Visit       Hyperlipidemia    Relevant Orders    ECG 12 Lead (Completed)    Hypertension    Relevant Orders    ECG 12 Lead (Completed)    Severe obesity (BMI >= 40) (CMS/[facility])    Relevant Orders    ECG 12 Lead (Completed)    Severe obstructive sleep apnea    Type 2 diabetes mellitus with hyperglycemia, without long-term current use of insulin (CMS/[facility])    Relevant Orders    ECG 12 Lead (Completed)    Coronary artery " calcification - Primary    Relevant Orders    ECG 12 Lead (Completed)    Bilateral edema of lower extremity    Relevant Orders    ECG 12 Lead (Completed)   ;      Geovanny Edouard DO, FACC, FACOI

## 2024-02-06 ENCOUNTER — OFFICE VISIT (OUTPATIENT)
Dept: CARDIOLOGY | Facility: CLINIC | Age: 44
End: 2024-02-06
Payer: COMMERCIAL

## 2024-02-06 VITALS
HEART RATE: 91 BPM | WEIGHT: 315 LBS | SYSTOLIC BLOOD PRESSURE: 138 MMHG | DIASTOLIC BLOOD PRESSURE: 86 MMHG | BODY MASS INDEX: 41.75 KG/M2 | HEIGHT: 73 IN

## 2024-02-06 DIAGNOSIS — I10 PRIMARY HYPERTENSION: ICD-10-CM

## 2024-02-06 DIAGNOSIS — I25.84 CORONARY ARTERY CALCIFICATION: Primary | ICD-10-CM

## 2024-02-06 DIAGNOSIS — E66.01 SEVERE OBESITY (BMI >= 40) (MULTI): ICD-10-CM

## 2024-02-06 DIAGNOSIS — E11.65 TYPE 2 DIABETES MELLITUS WITH HYPERGLYCEMIA, WITHOUT LONG-TERM CURRENT USE OF INSULIN (MULTI): ICD-10-CM

## 2024-02-06 DIAGNOSIS — G47.33 SEVERE OBSTRUCTIVE SLEEP APNEA: ICD-10-CM

## 2024-02-06 DIAGNOSIS — E78.00 PURE HYPERCHOLESTEROLEMIA: ICD-10-CM

## 2024-02-06 DIAGNOSIS — I25.10 CORONARY ARTERY CALCIFICATION: Primary | ICD-10-CM

## 2024-02-06 DIAGNOSIS — R60.0 BILATERAL EDEMA OF LOWER EXTREMITY: ICD-10-CM

## 2024-02-06 LAB
EST. AVERAGE GLUCOSE BLD GHB EST-MCNC: 157 MG/DL
HBA1C MFR BLD: 7.1 %

## 2024-02-06 PROCEDURE — 3008F BODY MASS INDEX DOCD: CPT | Performed by: INTERNAL MEDICINE

## 2024-02-06 PROCEDURE — 3079F DIAST BP 80-89 MM HG: CPT | Performed by: INTERNAL MEDICINE

## 2024-02-06 PROCEDURE — 99213 OFFICE O/P EST LOW 20 MIN: CPT | Performed by: INTERNAL MEDICINE

## 2024-02-06 PROCEDURE — 1036F TOBACCO NON-USER: CPT | Performed by: INTERNAL MEDICINE

## 2024-02-06 PROCEDURE — 3048F LDL-C <100 MG/DL: CPT | Performed by: INTERNAL MEDICINE

## 2024-02-06 PROCEDURE — 3075F SYST BP GE 130 - 139MM HG: CPT | Performed by: INTERNAL MEDICINE

## 2024-02-06 PROCEDURE — 93000 ELECTROCARDIOGRAM COMPLETE: CPT | Performed by: INTERNAL MEDICINE

## 2024-02-06 NOTE — PROGRESS NOTES
[facility] V[facility] Cardiology    Patient Name: Roby Caldwell  Patient : 1980      Scribe Attestation  By signing my name below, IViolette Scribe   attest that this documentation has been prepared under the direction and in the presence of Geovanny Edouard DO.      Reason for visit:  This is a 43-year-old male here for follow-up regarding his history of coronary artery calcification, hypertension, dyslipidemia, diabetes mellitus, lower extremity edema, and severe obesity.      HPI:  This is a 43-year-old male here for follow-up regarding his history of coronary artery calcification, hypertension, dyslipidemia, diabetes mellitus, lower extremity edema, and severe obesity.  The patient was last evaluated by me in 2023.  At that visit he was doing reasonably well and asked that he follow-up in 1 year.  CMP done 2024 showed normal serum sodium and potassium with a serum creatinine of 0.75, normal ALT/AST, CBC showed a hemoglobin of 11.5.  Lipid panel done in 2024 showed an LDL cholesterol of 70 and triglycerides of 94 while on atorvastatin 80 mg daily. ECG done today showed sinus rhythm with a heart rate of 92 bpm.  The patient reports that he has been feeling generally well from the cardiac standpoint. He denies any new chest pain, shortness of breath, palpitations and lightheadedness. He states that he does not monitor his blood pressure at home. He states that he takes all of his medications as prescribed. During my exam, he was resting comfortably on the exam table.            Assessment/Plan:   1. Coronary artery calcification  The patient has a history of coronary calcification with a total score of 433.63.   ECG done today showed sinus rhythm with a heart rate of 92 bpm.  He denies anginal chest discomfort.  Blood pressure appears moderately controlled on exam today.  He should continue current antihypertensive medications.  Echocardiogram done  1/23/2023 showed normal left ventricular systolic function with an ejection fraction of 60 to 65%, normal right ventricular systolic function, no significant valve abnormalities.   Recent lab works as noted in the HPI.  Lipid panel done in February 2024 showed an LDL cholesterol of 70 and triglycerides of 94 while on atorvastatin 80 mg daily.   Please see lifestyle recommendations below.  Follow up in 1 year and sooner if necessary.      2. Hypertension  Patient has a history of hypertension which appears moderately controlled on exam today.  He should continue his current antihypertensive medications and monitor his blood pressure at home.     3. Dyslipidemia  Lipid panel done in February 2024 showed an LDL cholesterol of 70 and triglycerides of 94 while on atorvastatin 80 mg daily.   Please see lifestyle recommendations below.     4. Diabetes Mellitus  Hemoglobin A1c done in July 2023 was 6.5%.  Medication management as per PCP.     5. Bilateral lower extremity edema  The patient has trace to 1+  pitting bilateral lower extremity edema on exam today.  I discussed with him the importance of following a low-sodium heart healthy diet as well as weight loss, wearing compression stockings and elevating legs when seated.     6. Severe obesity  Please see lifestyle recommendations below.      7. Obstructive sleep apnea  Stable on new CPAP machine.  Management as per PCP/Sleep Medicine      Order:   Follow-up in 1 year.    Lifestyle Recommendations  I recommend a whole-food plant-based diet, an eating pattern that encourages the consumption of unrefined plant foods (such as fruits, vegetables, tubers, whole grains, legumes, nuts and seeds) and discourages meats, dairy products, eggs and processed foods.     The AHA/ACC recommends that the patient consume a dietary pattern that emphasizes intake of vegetables, fruits, and whole grains; includes low-fat dairy products, poultry, fish, legumes, non-tropical vegetable oils,  and nuts; and limits intake of sodium, sweets, sugar-sweetened beverages, and red meats.  Adapt this dietary pattern to appropriate calorie requirements (a 500-750 kcal/day deficit to loose weight), personal and cultural food preferences, and nutrition therapy for other medical conditions (including diabetes).  Achieve this pattern by following plans such as the [place] Mediterranean, DASH dietary pattern, or AHA diet.     Engage in 2 hours and 30 minutes per week of moderate-intensity physical activity, or 1 hour and 15 minutes (75 minutes) per week of vigorous-intensity aerobic physical activity, or an equivalent combination of moderate and vigorous-intensity aerobic physical activity. Aerobic activity should be performed in episodes of at least 10 minutes preferably spread throughout the week.     Adhering to a heart healthy diet, regular exercise habits, avoidance of tobacco products, and maintenance of a healthy weight are crucial components of their heart disease risk reduction.     Any positive review of systems not specifically addressed in the office visit today should be evaluated and treated by the patients primary care physician or in an emergency department if necessary     Patient was notified that results from ordered tests will be called to the patient if it changes current management; it will otherwise be discussed at a future appointment and available on  [employer].     Thank you for allowing me to participate in the care of this patient.        This document was generated using the assistance of voice recognition software. If there are any errors of spelling, grammar, syntax, or meaning; please feel free to contact me directly for clarification.    Past Medical History:  He has a past medical history of Diabetes mellitus (CMS/HCC), Hypertension, and Unspecified lack of expected normal physiological development in childhood (06/05/2017).    Past Surgical History:  He has a past surgical history that  "includes Cyst Removal; Dental surgery; and LASIK (Bilateral).      Social History:  He reports that he has never smoked. He has never used smokeless tobacco. He reports that he does not drink alcohol and does not use drugs.    Family History:  Family History   Problem Relation Name Age of Onset    Hypothyroidism Mother      Graves' disease Mother      Prostate cancer Father Fabrizio Caldwell     Hypertension Father Fabrizio Caldwell     Cancer Father Fabrizio Caldwell     No Known Problems Brother          Allergies:  Patient has no known allergies.    Outpatient Medications:  Current Outpatient Medications   Medication Instructions    amLODIPine (Norvasc) 10 mg tablet TAKE ONE TABLET BY MOUTH EVERY DAY    atorvastatin (LIPITOR) 80 mg, oral, Nightly    cholecalciferol (Vitamin D-3) 50 mcg (2,000 unit) capsule 1 capsule, oral, Daily    escitalopram (Lexapro) 10 mg tablet TAKE ONE TABLET BY MOUTH EVERY DAY AFTER EATING    ferrous sulfate 325 (65 Fe) MG tablet 1 tablet, oral, Daily    fish oil concentrate (Omega-3) 120-180 mg capsule 1,000 mg, oral    glipiZIDE XL (Glucotrol XL) 10 mg 24 hr tablet TAKE 1 TABLET BY MOUTH DAILY WITH BREAKFAST AND DINNER    lisinopriL-hydrochlorothiazide 20-25 mg tablet TAKE ONE TABLET BY MOUTH DAILY    melatonin 3 mg, oral    metFORMIN XR (GLUCOPHAGE-XR) 1,000 mg, oral, 2 times daily with meals        ROS:  A 14 point review of systems was done and is negative other than as stated in HPI    Vitals:      10/25/2022    10:29 AM 1/5/2023     3:13 PM 1/24/2023     9:56 AM 2/13/2023     2:05 PM 8/2/2023    11:07 AM 11/30/2023    10:44 AM 2/2/2024    11:35 AM   Vitals   Systolic 140 132 132 122 148 130 118   Diastolic 78 74 82 78 82 89 76   Heart Rate  87  91  92 96   Temp   36.8 °C (98.3 °F)  36.7 °C (98 °F) 36.9 °C (98.4 °F) 36.5 °C (97.7 °F)   Resp    18  16    Height (in)  1.829 m (6')  1.829 m (6') 1.867 m (6' 1.5\") 1.844 m (6' 0.6\")     Weight (lb)  385 386 332.25 384 388.01 396.4   BMI  52.22 kg/m2 " 52.35 kg/m2 45.06 kg/m2 49.98 kg/m2 51.76 kg/m2 52.88 kg/m2   BSA (m2)  2.98 m2 2.98 m2 2.77 m2 3 m2 3 m2 3.04 m2       Significant value        Physical Exam:   Constitutional: Cooperative, in no acute distress, alert, appears stated age.   Skin: Skin color, texture, turgor normal. No rashes or lesions.   Head: Normocephalic. No masses, lesions, tenderness or abnormalities   Eyes: Extraocular movements are grossly intact.   Mouth and throat: Mucous membranes moist   Neck: Neck supple, no carotid bruits, no JVD   Respiratory: Lungs clear to auscultation, no wheezing or rhonchi, no use of accessory muscles   Chest wall: No scars, normal excursion with respiration   Cardiovascular: Regular rhythm, no murmur  Gastrointestinal: Abdomen soft, nontender. Bowel sounds normal. Morbidly obese.  Musculoskeletal: Strength equal in upper extremities   Extremities: Trace to 1+ pitting edema   Neurologic: Sensation grossly intact, alert and oriented x3    Intake/Output:   No intake/output data recorded.    Outpatient Medications  Current Outpatient Medications on File Prior to Visit   Medication Sig Dispense Refill    amLODIPine (Norvasc) 10 mg tablet TAKE ONE TABLET BY MOUTH EVERY DAY 90 tablet 0    atorvastatin (Lipitor) 80 mg tablet Take 1 tablet (80 mg) by mouth once daily at bedtime. 90 tablet 1    cholecalciferol (Vitamin D-3) 50 mcg (2,000 unit) capsule Take 1 capsule (50 mcg) by mouth once daily.      escitalopram (Lexapro) 10 mg tablet TAKE ONE TABLET BY MOUTH EVERY DAY AFTER EATING 90 tablet 0    ferrous sulfate 325 (65 Fe) MG tablet Take 1 tablet by mouth once daily.      fish oil concentrate (Omega-3) 120-180 mg capsule Take 1 capsule (1,000 mg) by mouth.      glipiZIDE XL (Glucotrol XL) 10 mg 24 hr tablet TAKE 1 TABLET BY MOUTH DAILY WITH BREAKFAST AND DINNER 180 tablet 0    lisinopriL-hydrochlorothiazide 20-25 mg tablet TAKE ONE TABLET BY MOUTH DAILY 90 tablet 1    melatonin 3 mg capsule Take 3 mg by mouth.       metFORMIN  mg 24 hr tablet take 2 tablets by mouth twice daily with morning and evening meals 360 tablet 0     No current facility-administered medications on file prior to visit.       Labs: (past 26 weeks)  Recent Results (from the past 4368 hour(s))   CBC and Auto Differential    Collection Time: 11/30/23 11:40 AM   Result Value Ref Range    WBC 13.0 (H) 4.4 - 11.3 x10*3/uL    RBC 4.91 4.50 - 5.90 x10*6/uL    Hemoglobin 12.9 (L) 13.5 - 17.5 g/dL    Hematocrit 40.6 (L) 41.0 - 52.0 %    MCV 83 80 - 100 fL    MCH 26.3 26.0 - 34.0 pg    MCHC 31.8 (L) 32.0 - 36.0 g/dL    RDW 14.3 11.5 - 14.5 %    Platelets 317 150 - 450 x10*3/uL    Neutrophils % 77.3 40.0 - 80.0 %    Immature Granulocytes %, Automated 0.5 0.0 - 0.9 %    Lymphocytes % 14.4 13.0 - 44.0 %    Monocytes % 5.9 2.0 - 10.0 %    Eosinophils % 1.6 0.0 - 6.0 %    Basophils % 0.3 0.0 - 2.0 %    Neutrophils Absolute 10.02 (H) 1.20 - 7.70 x10*3/uL    Immature Granulocytes Absolute, Automated 0.07 0.00 - 0.70 x10*3/uL    Lymphocytes Absolute 1.87 1.20 - 4.80 x10*3/uL    Monocytes Absolute 0.77 0.10 - 1.00 x10*3/uL    Eosinophils Absolute 0.21 0.00 - 0.70 x10*3/uL    Basophils Absolute 0.04 0.00 - 0.10 x10*3/uL   Comprehensive Metabolic Panel    Collection Time: 11/30/23 11:40 AM   Result Value Ref Range    Glucose 113 (H) 74 - 99 mg/dL    Sodium 136 136 - 145 mmol/L    Potassium 4.2 3.5 - 5.3 mmol/L    Chloride 99 98 - 107 mmol/L    Bicarbonate 30 21 - 32 mmol/L    Anion Gap 11 10 - 20 mmol/L    Urea Nitrogen 13 6 - 23 mg/dL    Creatinine 0.87 0.50 - 1.30 mg/dL    eGFR >90 >60 mL/min/1.73m*2    Calcium 9.5 8.6 - 10.3 mg/dL    Albumin 4.0 3.4 - 5.0 g/dL    Alkaline Phosphatase 102 33 - 120 U/L    Total Protein 8.2 6.4 - 8.2 g/dL    AST 10 9 - 39 U/L    Bilirubin, Total 0.5 0.0 - 1.2 mg/dL    ALT 12 10 - 52 U/L   Iron and TIBC    Collection Time: 11/30/23 11:40 AM   Result Value Ref Range    Iron 46 35 - 150 ug/dL    UIBC 272 110 - 370 ug/dL    TIBC 318 240 -  445 ug/dL    % Saturation 14 (L) 25 - 45 %   Vitamin B12    Collection Time: 11/30/23 11:40 AM   Result Value Ref Range    Vitamin B12 341 211 - 911 pg/mL   Folate    Collection Time: 11/30/23 11:40 AM   Result Value Ref Range    Folate, Serum 20.2 >5.0 ng/mL   BCR/ABL1, FISH    Collection Time: 11/30/23 11:40 AM   Result Value Ref Range    ISCN       FISH NEGATIVE for BCR::ABL1 rearrangement/translocation    nuc ned(ABL1,BCR)x2[250]      Cytogenetics Interpretation       Results Summary  Fluorescence in situ hybridization (FISH) analysis showed NORMAL results with no evidence of BCR::ABL1 rearrangement/9;22 translocation.    Anomaly Result (%) Reference range (%)   BCR::ABL1 0.0% (0.2% or greater)   METHODOLOGY   The dual color, dual fusion BCR (22q11.2) and ABL1 (9q34) probes (3225 films, [place], [place]) were used in this interphase FISH assay. This test should not be used for the detection of minimal residual disease.  Number of nuclei scored: 250  Number of techs: 2    ANALYTE SPECIFIC REAGENT (ASR) DISCLAIMER  This FISH test was developed and its performance characteristics determined by the [facility].  It has not been cleared or approved by the U.S. Food and Drug Administration.  The FDA has determined that such clearance or approval is not necessary.  This test is used for clinical purposes.  It should not be regarded as investigational or for research.  This laboratory is certified under the Clinical Laboratory Improvement Amendments (CLIA) as qualified to perform high complexity laboratory testing.      Electronically signed and reported by       William Hu MD        Electronically co-signed by       Pierre Newman MD       Sedimentation Rate    Collection Time: 11/30/23 11:40 AM   Result Value Ref Range    Sedimentation Rate 32 (H) 0 - 15 mm/h   BARB with Reflex to AKASH    Collection Time: 11/30/23 11:40 AM   Result Value Ref Range    BARB Negative Negative    Comprehensive Metabolic Panel    Collection Time: 02/05/24  8:02 AM   Result Value Ref Range    Glucose 126 (H) 74 - 99 mg/dL    Sodium 139 136 - 145 mmol/L    Potassium 4.2 3.5 - 5.3 mmol/L    Chloride 102 98 - 107 mmol/L    Bicarbonate 30 21 - 32 mmol/L    Anion Gap 11 10 - 20 mmol/L    Urea Nitrogen 15 6 - 23 mg/dL    Creatinine 0.75 0.50 - 1.30 mg/dL    eGFR >90 >60 mL/min/1.73m*2    Calcium 8.7 8.6 - 10.3 mg/dL    Albumin 3.5 3.4 - 5.0 g/dL    Alkaline Phosphatase 92 33 - 120 U/L    Total Protein 6.4 6.4 - 8.2 g/dL    AST 10 9 - 39 U/L    Bilirubin, Total 0.4 0.0 - 1.2 mg/dL    ALT 12 10 - 52 U/L   Lipid Panel    Collection Time: 02/05/24  8:02 AM   Result Value Ref Range    Cholesterol 129 0 - 199 mg/dL    HDL-Cholesterol 40.5 mg/dL    Cholesterol/HDL Ratio 3.2     LDL Calculated 70 <=99 mg/dL    VLDL 19 0 - 40 mg/dL    Triglycerides 94 0 - 149 mg/dL    Non HDL Cholesterol 89 0 - 149 mg/dL   CBC and Auto Differential    Collection Time: 02/05/24  8:02 AM   Result Value Ref Range    WBC 12.3 (H) 4.4 - 11.3 x10*3/uL    nRBC 0.0 0.0 - 0.0 /100 WBCs    RBC 4.39 (L) 4.50 - 5.90 x10*6/uL    Hemoglobin 11.5 (L) 13.5 - 17.5 g/dL    Hematocrit 37.2 (L) 41.0 - 52.0 %    MCV 85 80 - 100 fL    MCH 26.2 26.0 - 34.0 pg    MCHC 30.9 (L) 32.0 - 36.0 g/dL    RDW 15.1 (H) 11.5 - 14.5 %    Platelets 294 150 - 450 x10*3/uL    Neutrophils % 74.1 40.0 - 80.0 %    Immature Granulocytes %, Automated 0.7 0.0 - 0.9 %    Lymphocytes % 16.8 13.0 - 44.0 %    Monocytes % 6.4 2.0 - 10.0 %    Eosinophils % 1.6 0.0 - 6.0 %    Basophils % 0.4 0.0 - 2.0 %    Neutrophils Absolute 9.11 (H) 1.20 - 7.70 x10*3/uL    Immature Granulocytes Absolute, Automated 0.08 0.00 - 0.70 x10*3/uL    Lymphocytes Absolute 2.07 1.20 - 4.80 x10*3/uL    Monocytes Absolute 0.79 0.10 - 1.00 x10*3/uL    Eosinophils Absolute 0.20 0.00 - 0.70 x10*3/uL    Basophils Absolute 0.05 0.00 - 0.10 x10*3/uL       ECG  No results found for this or any previous visit (from the past  4464 hour(s)).    Echocardiogram  No results found for this or any previous visit from the past 1095 days.      CV Studies:  EKG:No results found for this or any previous visit (from the past 4464 hour(s)).  Echocardiogram:   Echocardiogram     [place] [id number]  Phone [phone] Fax [phone]    TRANSTHORACIC ECHOCARDIOGRAM REPORT      Patient Name:     BETINA KATHERINE Carter Physician:  [id number] Maricel BERNAL MD  Study Date:       1/23/2023       Referring           MARISSA ORDONEZ  Physician:  MRN/PID:          [id number]        PCP:  Accession/Order#: [id number]    [facility]  Location:  YOB: 1980       Fellow:  Gender:           M               Nurse:              Lluvia Rincon RN PRN  Admit Date:                       Sonographer:        Liza Brice  Admission Status: Outpatient      Additional Staff:  Height:           182.88 cm       CC Report to:  Weight:           174.63 kg       Study Type:         Echocardiogram  BSA:              2.81 m2  Blood Pressure: 132 /74 mmHg    Diagnosis/ICD: I25.10-Atherosclerotic heart disease of native coronary artery  without angina pectoris; I10-Essential (primary) hypertension  Indication:    Coronary artery calcification, HTN  Procedure/CPT: Echo Complete w Full [id number]  Study Detail: The following Echo studies were performed: 2D, M-Mode, Doppler and  color flow. Technically challenging study due to poor acoustic  windows and body habitus. Optison used as a contrast agent for  endocardial border definition.      PHYSICIAN INTERPRETATION:  Left Ventricle: Left ventricular systolic function is normal, with an estimated ejection fraction of 60-65%. There are no regional wall motion abnormalities. The left ventricular cavity size is normal. Left ventricular diastolic filling was indeterminate.  Left Atrium: The left  atrium is moderately dilated.  Right Ventricle: The right ventricle is normal in size. There is normal right ventricular global systolic function.  Right Atrium: The right atrium is normal in size.  Aortic Valve: The aortic valve was not well visualized. There is no evidence of aortic valve regurgitation. The peak instantaneous gradient of the aortic valve is 12.5 mmHg. The mean gradient of the aortic valve is 7.4 mmHg.  Mitral Valve: The mitral valve is normal in structure. There is no evidence of mitral valve regurgitation.  Tricuspid Valve: The tricuspid valve is structurally normal. There is trace tricuspid regurgitation. The Doppler estimated RVSP is mildly elevated at 39.5 mmHg.  Pulmonic Valve: The pulmonic valve is not well visualized. The pulmonic valve regurgitation was not well visualized.  Pericardium: There is no pericardial effusion noted.  Aorta: The aortic root is normal.      CONCLUSIONS:  1. Left ventricular systolic function is normal with a 60-65% estimated ejection fraction.  2. The left atrium is moderately dilated.  3. Mildly elevated RVSP.    QUANTITATIVE DATA SUMMARY:  2D MEASUREMENTS:  Normal Ranges:  LAs: 3.56 cm (2.7-4.0cm)    LA VOLUME:  Normal Ranges:  LA Vol A4C:        106.0 ml   (22+/-6mL/m2)  LA Vol A2C:        89.8 ml  LA Vol BP:         98.3 ml  LA Vol Index A4C:  37.7 ml/m2  LA Vol Index A2C:  31.9 ml/m2  LA Vol Index BP:   34.9 ml/m2  LA Area A4C:       28.9 cm2  LA Area A2C:       26.4 cm2  LA Major Axis A4C: 6.7 cm  LA Major Axis A2C: 6.6 cm  LA Vol A4C:        98.5 ml  LA Vol A2C:        85.7 ml    RA VOLUME BY A/L METHOD:  Normal Ranges:  RA Area A4C: 18.4 cm2    AORTA MEASUREMENTS:  Normal Ranges:  Asc Ao, d: 3.30 cm (2.1-3.4cm)    LV SYSTOLIC FUNCTION BY 2D PLANIMETRY (MOD):  Normal Ranges:  EF-A4C View: 60.8 % (>=55%)  EF-A2C View: 73.1 %  EF-Biplane:  68.0 %    LV DIASTOLIC FUNCTION:  Normal Ranges:  MV Peak E:    1.15 m/s    (0.7-1.2 m/s)  MV Peak A:    0.71 m/s     (0.42-0.7 m/s)  E/A Ratio:    1.63        (1.0-2.2)  MV e'         0.10 m/s    (>8.0)  MV lateral e' 0.09 m/s  MV medial e'  0.10 m/s  MV A Dur:     139.87 msec  E/e' Ratio:   12.14       (<8.0)    MITRAL VALVE:  Normal Ranges:  MV DT: 158 msec (150-240msec)    AORTIC VALVE:  Normal Ranges:  AoV Vmax:                1.77 m/s  (<=1.7m/s)  AoV Peak P.5 mmHg (<20mmHg)  AoV Mean P.4 mmHg  (1.7-11.5mmHg)  LVOT Max Uriel:            1.60 m/s  (<=1.1m/s)  AoV VTI:                 33.17 cm  (18-25cm)  LVOT VTI:                29.04 cm  LVOT Diameter:           1.91 cm   (1.8-2.4cm)  AoV Area, VTI:           2.51 cm2  (2.5-5.5cm2)  AoV Area,Vmax:           2.59 cm2  (2.5-4.5cm2)  AoV Dimensionless Index: 0.88    RIGHT VENTRICLE:  RV 1   4.0 cm  RV 2   2.8 cm  RV 3   8.0 cm  TAPSE: 30.5 mm  RV s'  0.12 m/s    TRICUSPID VALVE/RVSP:  Normal Ranges:  Peak TR Velocity: 2.47 m/s  RV Syst Pressure: 39.5 mmHg (< 30mmHg)  TV E Vmax:        0.52 m/s  (0.3-0.7m/s)  TV A Vmax:        0.54 m/s  IVC Diam:         2.99 cm  TV e'             0.1 m/s    AORTA:  Asc Ao Diam 3.31 cm      [id number] Maricel Wells MD  Electronically signed on 2023 at 11:41:02 AM         Final     Stress Testing IMGRESULT([id number]:1825):   NM CARDIAC STRESS REST (MYOCARDIAL PERFUSION MIBI) 2023    Narrative  MRN: [id number]  Patient Name: BETINA BERNAL    STUDY:  CARDIAC STRESS/REST INJECTION;  2023 11:35 am    INDICATION:  Elevated calcium score  E78.5: Hyperlipidemia I10: Hypertension  E66.01: Morbid obesity with body mass index (BMI) of 40.0 or higher.    COMPARISON:  None.    ACCESSION NUMBER(S):  [id number]    ORDERING CLINICIAN:  MARISSA ORDONEZ    TECHNIQUE:  DIVISION OF NUCLEAR MEDICINE  STRESS MYOCARDIAL PERFUSION SCAN, ONE DAY PROTOCOL    The patient received an intravenous dose of  10.8 mCi of Tc-99m  tetrofosmin and resting emission tomographic (SPECT) images of the  myocardium were acquired. The patient then  exercised via treadmill  stress to  85 % of MPHR and achieved  7 METS. At peak stress  33.8  mCi of Tc-99m tetrofosmin were administered and stress phase SPECT  images of the myocardium were then acquired. These included ECG-gated  images to assess and quantify ventricular function.    FINDINGS:    There is normal perfusion in all major segments. There is normal wall  motion and normal left ventricular function . The gated ejection  fraction is  61%(Normal over 50%).    Impression  Normal exercise stress myocardial perfusion imaging.    Cardiac Catheterization: No results found for this or any previous visit from the past 1825 days.  No results found for this or any previous visit from the past 3650 days.     Cardiac Scoring:   CT HEART CALCIUM SCORING WO IV CONTRAST 05/26/2022    Narrative  MRN: [id number]  Patient Name: BETINA BERNAL    STUDY:  CT CARDIAC SCORING;  5/26/2022 8:07 am    INDICATION:  HLD; Hypertriglyceridemia; Class 3 Obesity; CAD per CT Cardiac Score  5/25/17  E78.5: Hyperlipidemia I25.10: CAD (coronary artery disease)  E66.01: Class 3 severe obesity due to excess calories with serious  comorbidity and body mass index (BMI) of.    COMPARISON:  05/26/2017    ACCESSION NUMBER(S):  [id number]    ORDERING CLINICIAN:  GERARDO FORBES    TECHNIQUE:  Using prospective ECG gating, CT scan of the coronary arteries was  performed without intravenous contrast. Coronary calcium scoring  was  performed according to the method of Agatston.    FINDINGS:  The score and distribution of calcium in the coronary arteries is as  follows:    LM 36.44 ,  .44,  LCx 6.02,  RCA 87.73,    Total 433.63    The visualized mid/lower ascending thoracic aorta measures 3.4 cm in  diameter. The heart is normal in size. No pericardial effusion is  present.    No gross evidence of mediastinal or hilar lymphadenopathy or masses  is identified. The visualized segments of the lungs are normally  expanded.    The visualized  "subdiaphragmatic structures appear intact.    Impression  1. Coronary artery calcium score of 433.63*. This is compared to a  prior calcium score of 122.22 On 05/26/2017.    *Coronary artery calcium scoring may be helpful in predicting the  risk for future coronary heart disease events.  According to the  American College of Card[employer] Foundation Clinical Expert Consensus  Task Force, such testing provides important prognostic information in  patients with more than one coronary heart disease risk factor. The  coronary artery calcium score correlates with the annual risk of a  non-fatal myocardial infarction or coronary heart disease death.    Coronary artery score            Annual Risk    0-99                             0.4%  100-399                        1.3%  >400                            2.4%    These three \"breakpoints\" correspond to lower, intermediate and high  risk states for future coronary events.  Such information should be  used, along with appropriate clinical judgment, to make decisions  regarding the intensity of risk factor management strategies to treat  blood lipids and to modify other non-lipid coronary risk factors.    Reference: Lefors P et al. Circulation.  2007; 115:402-426    AAA : No results found for this or any previous visit from the past 1825 days.    OTHER: No results found for this or any previous visit from the past 1825 days.    LAST IMAGING RESULTS  ELECTROCARDIOGRAM RHYTHM STRIP  Ordered by an unspecified provider.    Problem List Items Addressed This Visit       Hyperlipidemia    Relevant Orders    ECG 12 Lead (Completed)    Hypertension    Relevant Orders    ECG 12 Lead (Completed)    Severe obesity (BMI >= 40) (CMS/[facility])    Relevant Orders    ECG 12 Lead (Completed)    Severe obstructive sleep apnea    Type 2 diabetes mellitus with hyperglycemia, without long-term current use of insulin (CMS/[facility])    Relevant Orders    ECG 12 Lead (Completed)    Coronary artery " calcification - Primary    Relevant Orders    ECG 12 Lead (Completed)    Bilateral edema of lower extremity    Relevant Orders    ECG 12 Lead (Completed)   ;      Geovanny Edouard DO, FACC, FACOI

## 2024-02-12 ENCOUNTER — APPOINTMENT (OUTPATIENT)
Dept: CARDIOLOGY | Facility: CLINIC | Age: 44
End: 2024-02-12
Payer: COMMERCIAL

## 2024-02-13 ENCOUNTER — APPOINTMENT (OUTPATIENT)
Dept: CARDIOLOGY | Facility: CLINIC | Age: 44
End: 2024-02-13
Payer: COMMERCIAL

## 2024-03-21 DIAGNOSIS — F34.1 PERSISTENT DEPRESSIVE DISORDER: ICD-10-CM

## 2024-03-22 RX ORDER — ESCITALOPRAM OXALATE 10 MG/1
TABLET ORAL
Qty: 90 TABLET | Refills: 3 | Status: SHIPPED | OUTPATIENT
Start: 2024-03-22

## 2024-03-28 ENCOUNTER — OFFICE VISIT (OUTPATIENT)
Dept: HEMATOLOGY/ONCOLOGY | Facility: CLINIC | Age: 44
End: 2024-03-28
Payer: COMMERCIAL

## 2024-03-28 VITALS
BODY MASS INDEX: 41.75 KG/M2 | HEART RATE: 89 BPM | DIASTOLIC BLOOD PRESSURE: 83 MMHG | OXYGEN SATURATION: 93 % | HEIGHT: 73 IN | RESPIRATION RATE: 16 BRPM | SYSTOLIC BLOOD PRESSURE: 144 MMHG | TEMPERATURE: 97.9 F | WEIGHT: 315 LBS

## 2024-03-28 DIAGNOSIS — D72.829 LEUKOCYTOSIS, UNSPECIFIED TYPE: ICD-10-CM

## 2024-03-28 DIAGNOSIS — D64.9 ANEMIA, UNSPECIFIED TYPE: ICD-10-CM

## 2024-03-28 LAB
BASOPHILS # BLD AUTO: 0.02 X10*3/UL (ref 0–0.1)
BASOPHILS NFR BLD AUTO: 0.2 %
EOSINOPHIL # BLD AUTO: 0.21 X10*3/UL (ref 0–0.7)
EOSINOPHIL NFR BLD AUTO: 1.9 %
ERYTHROCYTE [DISTWIDTH] IN BLOOD BY AUTOMATED COUNT: 14.7 % (ref 11.5–14.5)
HCT VFR BLD AUTO: 38.5 % (ref 41–52)
HGB BLD-MCNC: 12.6 G/DL (ref 13.5–17.5)
IMM GRANULOCYTES # BLD AUTO: 0.04 X10*3/UL (ref 0–0.7)
IMM GRANULOCYTES NFR BLD AUTO: 0.4 % (ref 0–0.9)
LYMPHOCYTES # BLD AUTO: 1.74 X10*3/UL (ref 1.2–4.8)
LYMPHOCYTES NFR BLD AUTO: 16 %
MCH RBC QN AUTO: 27 PG (ref 26–34)
MCHC RBC AUTO-ENTMCNC: 32.7 G/DL (ref 32–36)
MCV RBC AUTO: 83 FL (ref 80–100)
MONOCYTES # BLD AUTO: 0.76 X10*3/UL (ref 0.1–1)
MONOCYTES NFR BLD AUTO: 7 %
NEUTROPHILS # BLD AUTO: 8.13 X10*3/UL (ref 1.2–7.7)
NEUTROPHILS NFR BLD AUTO: 74.5 %
PLATELET # BLD AUTO: 291 X10*3/UL (ref 150–450)
RBC # BLD AUTO: 4.66 X10*6/UL (ref 4.5–5.9)
WBC # BLD AUTO: 10.9 X10*3/UL (ref 4.4–11.3)

## 2024-03-28 PROCEDURE — 99214 OFFICE O/P EST MOD 30 MIN: CPT | Performed by: INTERNAL MEDICINE

## 2024-03-28 PROCEDURE — 3079F DIAST BP 80-89 MM HG: CPT | Performed by: INTERNAL MEDICINE

## 2024-03-28 PROCEDURE — 3008F BODY MASS INDEX DOCD: CPT | Performed by: INTERNAL MEDICINE

## 2024-03-28 PROCEDURE — 3048F LDL-C <100 MG/DL: CPT | Performed by: INTERNAL MEDICINE

## 2024-03-28 PROCEDURE — 36415 COLL VENOUS BLD VENIPUNCTURE: CPT | Performed by: INTERNAL MEDICINE

## 2024-03-28 PROCEDURE — 3051F HG A1C>EQUAL 7.0%<8.0%: CPT | Performed by: INTERNAL MEDICINE

## 2024-03-28 PROCEDURE — 3077F SYST BP >= 140 MM HG: CPT | Performed by: INTERNAL MEDICINE

## 2024-03-28 PROCEDURE — 85025 COMPLETE CBC W/AUTO DIFF WBC: CPT | Performed by: INTERNAL MEDICINE

## 2024-03-28 ASSESSMENT — PATIENT HEALTH QUESTIONNAIRE - PHQ9
1. LITTLE INTEREST OR PLEASURE IN DOING THINGS: NOT AT ALL
2. FEELING DOWN, DEPRESSED OR HOPELESS: NOT AT ALL
SUM OF ALL RESPONSES TO PHQ9 QUESTIONS 1 AND 2: 0

## 2024-03-28 ASSESSMENT — COLUMBIA-SUICIDE SEVERITY RATING SCALE - C-SSRS
2. HAVE YOU ACTUALLY HAD ANY THOUGHTS OF KILLING YOURSELF?: NO
1. IN THE PAST MONTH, HAVE YOU WISHED YOU WERE DEAD OR WISHED YOU COULD GO TO SLEEP AND NOT WAKE UP?: NO
6. HAVE YOU EVER DONE ANYTHING, STARTED TO DO ANYTHING, OR PREPARED TO DO ANYTHING TO END YOUR LIFE?: NO

## 2024-03-28 ASSESSMENT — PAIN SCALES - GENERAL: PAINLEVEL: 0-NO PAIN

## 2024-03-28 NOTE — PROGRESS NOTES
Patient Visit Information:   Visit Type: Benign Heme New Visit      History of Present Illness:   Diagnoses, reactive leukocytosis, BCR-ABL negative  ID Statement:    BETINA BERNAL is a 43 year old Male        Chief Complaint: Leukocytosis   Interval History:    3/28/24  Patient evaluated for leukocytosis patient has fluctuating leukocytosis since 2020.  B12 level iron studies within normal limits, BARB negative, sedimentation rate slightly elevated at 32.  BCR-ABL FISH negative.    Lab result discussed with the patient.  Patient complaining of mild weakness fatigue some shortness of breath sometimes nasal congestion, no fever and chills    Patient is 43-year -old female with a history of   hypertension, hyperlipidemia, obesity, BMI 50, anxiety and depression, vitamin D deficiency, history of sleep apnea,  COPD, anemia and leukocytosis.  Recent CBC did show WBC count 13.5 and patient does not have leukocytosis since 2020.    Patient was evaluated back in December 2021 for leukocytosis and it was thought most probably patient has reactive leukocytosis.     On December 27, 2021 CBC did show WBC count 15.8, hemoglobin 13.1, hematocrit 41.5, platelets 334 with 76% neutrophils, 14.5% lymphocyte, 6.9% monocyte, 1.2% eosinophil and 0.4% basophil.  Absolute neutrophil count was 12.1.       Patient denied any history of fever, night sweats, sore throat, skin rash.  Sometimes patient has arthritis.  Working full-time, not doing regular exercise.  He is overweight and history of sleep apnea.  No chest pain, no shortness of breath, no nausea  vomiting, no abdominal pain, no diarrhea, no dysuria and hematuria.      Patient has a history of mildly leukocytosis.      In December 2020 WBC count 14.0, hemoglobin 13.5, hematocrit 42.7, platelet 340      In January 2021 WBC count 11.5, hemoglobin 12.1, hematocrit 40.8, platelets 300      In March 2021 WBC count 11.3, hemoglobin 13.1, hematocrit 41.1, platelets 295      In August  2021 WBC count 11.9, hemoglobin 14.1, hematocrit 44.7, platelets 298      December 2021 WBC count was 15.8 as mentioned above.  Differential count has been within normal limits did not show any immature cells    7/22/23 , WBC count 13.6, hemoglobin 12.0, platelets 319 with 76% neutrophil and 14% lymphocyte     Patient is diabetic and renal function LFT within normal limit      Available medical records reviewed     Review of Systems:   Review of Systems:    No headache and dizziness      No fever, no night sweats      No photosensitivity      History of sleep apnea      No chest pain, no shortness of breath      No nausea vomiting      No abdominal pain      No diarrhea and constipation      No dysuria or hematuria      Patient has some arthritis, no skin rash      Not doing regular exercise          Outpatient Medication Profile:  * Patient Currently Takes Medications as of 09-Mar-2022 11:19 documented in Structured Notes        Family History: No Family History items are recorded  in the problem list.      Social History:   Social Substance History:  ·  Smoking Status never smoker    ·  Alcohol Use denies   ·  Drug Use denies             Vitals and Measurements:   Vitals: Temp: 36.3  HR: 94  RR: 18  BP: 143/89  SPO2%:   96   Measurements: HT(cm): 186  WT(kg): 171.5  BSA: 2.97   BMI:  49.5   Last 3 Weights & Heights: Date:                           Weight/Scale Type:                    Height:   09-Mar-2022 11:09                171.5  kg / standing scale                     186  cm         Physical Exam:      Constitutional: awake/alert/oriented x3, no distress,   obese   Eyes: PERRL, EOMI, clear sclera   ENMT: mucous membranes moist, no apparent injury,  very narrow throat   Head/Neck: Neck supple,   thyroid without mass or  tenderness, No JVD, trachea midline, no bruits   Respiratory/Thorax: Patent airways, CTAB, normal  breath sounds   Cardiovascular: Regular, rate and rhythm, no murmurs,    normal S 1and S 2    Gastrointestinal: Nondistended, soft, non-tender,  no rebound tenderness or guarding, no masses palpable, no organomegaly, +BS,   Genitourinary: No CVA tenderness   Musculoskeletal: ROM intact, no joint swelling, normal  strength   Extremities: normal extremities, no cyanosis edema,  contusions or wounds, no clubbing   Neurological: alert and oriented x3, intact senses,  motor, response and reflexes, normal strength   Lymphatic: No significant lymphadenopathy   Psychological: Appropriate mood and behavior   Skin: Warm and dry, no lesions, no rashes         Lab Results:     10:04  (3/28/24) 1 mo ago  (2/5/24) 3 mo ago  (11/30/23) 8 mo ago  (7/22/23) 2 yr ago  (3/9/22) 2 yr ago  (12/27/21) 2 yr ago  (8/2/21)    WBC  4.4 - 11.3 x10*3/uL 10.9 12.3 High  13.0 High  13.6 High  R 14.5 High  R 15.8 High  R 11.2 R   RBC  4.50 - 5.90 x10*6/uL 4.66 4.39 Low  4.91 4.50 R 4.81 R 4.85 R 5.14 R   Hemoglobin  13.5 - 17.5 g/dL 12.6 Low  11.5 Low  12.9 Low  12.0 Low  13.1 Low  13.1 Low  14.1   Hematocrit  41.0 - 52.0 % 38.5 Low  37.2 Low  40.6 Low  38.3 Low  39.8 Low  41.5 44.7   MCV  80 - 100 fL 83 85 83 85 83 86 87   MCH  26.0 - 34.0 pg 27.0 26.2 26.3       MCHC  32.0 - 36.0 g/dL 32.7 30.9 Low  31.8 Low  31.3 Low  32.9 31.6 Low  31.5 Low    RDW  11.5 - 14.5 % 14.7 High  15.1 High  14.3 14.8 High  14.3 14.3 14.0   Platelets  150 - 450 x10*3/uL 291 294 317 319 R 326 R 334 R 298 R     ISCN    FISH NEGATIVE for BCR::ABL1 rearrangement/translocation    nuc ned(ABL1,BCR)x2[250]   Cytogenetics Interpretation    Results Summary  Fluorescence in situ hybridization (FISH) analysis showed NORMAL results with no evidence of BCR::ABL1 rearrangement/9;22 translocation.     Anomaly Result (%) Reference range (%)   BCR::ABL1 0.0% (0.2% or greater)        ·  Results              Assessment and Plan:      Assessment and Plan:   Assessment:    1.  Reactive leukocytosis, since December 2020 which is fluctuating, normal differential count        2.   Anemia which is stable      3.  Hypertension      4.  Diabetes mellitus      5.  Sleep apnea, COPD      6.  Hyper lipidemia        Patient is a 43-year-old gentleman with history of hypertension, diabetes mellitus, obesity, sleep apnea evaluated for leukocytosis.  Patient has leukocytosis since December 2020 WBC 3, run between 11.5-15.8 with normal differential count.  Physical examination  unremarkable no evidence of lymphadenopathy and hepatosplenomegaly, no history of fever or night sweats.      Most probably we are dealing reactive leukocytosis, I will repeat her CBC today, check ESR, BARB, iron study and B12 level.  Because of persistent leukocytosis I will send BCR-ABL studies to rule out underlying myeloproliferative disorder which clinically seem to be less likely.    Pathophysiology of leukocytosis discussed with patient and most probably we are dealing with chronic benign leukocytosis reactive leukocytosis.    3/28/24   #1 reactive leukocytosis, clinically no change in clinically status, today WBC count 10.9.  BCR-ABL FISH negative.  All lab result discussed with the patient.  Hematological point of view no further workup at this time.  Patient is advised to call if he has any questions or concern.  Follow-up after 1 year     Plan:    Above      Time spent 30 minutes.

## 2024-04-13 DIAGNOSIS — E11.65 TYPE 2 DIABETES MELLITUS WITH HYPERGLYCEMIA, WITHOUT LONG-TERM CURRENT USE OF INSULIN (MULTI): ICD-10-CM

## 2024-04-13 DIAGNOSIS — I10 PRIMARY HYPERTENSION: ICD-10-CM

## 2024-04-16 RX ORDER — GLIPIZIDE 10 MG/1
TABLET, FILM COATED, EXTENDED RELEASE ORAL
Qty: 180 TABLET | Refills: 0 | Status: SHIPPED | OUTPATIENT
Start: 2024-04-16

## 2024-04-16 RX ORDER — LISINOPRIL AND HYDROCHLOROTHIAZIDE 20; 25 MG/1; MG/1
TABLET ORAL
Qty: 90 TABLET | Refills: 0 | Status: SHIPPED | OUTPATIENT
Start: 2024-04-16

## 2024-06-11 DIAGNOSIS — E78.2 MIXED HYPERLIPIDEMIA: ICD-10-CM

## 2024-06-11 DIAGNOSIS — I25.10 CORONARY ARTERY DISEASE INVOLVING NATIVE CORONARY ARTERY OF NATIVE HEART, UNSPECIFIED WHETHER ANGINA PRESENT: ICD-10-CM

## 2024-06-11 RX ORDER — ATORVASTATIN CALCIUM 80 MG/1
80 TABLET, FILM COATED ORAL NIGHTLY
Qty: 90 TABLET | Refills: 3 | Status: SHIPPED | OUTPATIENT
Start: 2024-06-11 | End: 2025-06-06

## 2024-07-24 DIAGNOSIS — I10 PRIMARY HYPERTENSION: ICD-10-CM

## 2024-07-24 DIAGNOSIS — E11.65 TYPE 2 DIABETES MELLITUS WITH HYPERGLYCEMIA, WITHOUT LONG-TERM CURRENT USE OF INSULIN (MULTI): ICD-10-CM

## 2024-07-26 RX ORDER — LISINOPRIL AND HYDROCHLOROTHIAZIDE 20; 25 MG/1; MG/1
TABLET ORAL
Qty: 90 TABLET | Refills: 0 | Status: SHIPPED | OUTPATIENT
Start: 2024-07-26

## 2024-07-26 RX ORDER — AMLODIPINE BESYLATE 10 MG/1
TABLET ORAL
Qty: 90 TABLET | Refills: 0 | Status: SHIPPED | OUTPATIENT
Start: 2024-07-26

## 2024-07-26 RX ORDER — METFORMIN HYDROCHLORIDE 500 MG/1
1000 TABLET, EXTENDED RELEASE ORAL
Qty: 360 TABLET | Refills: 0 | Status: SHIPPED | OUTPATIENT
Start: 2024-07-26

## 2024-07-26 RX ORDER — GLIPIZIDE 10 MG/1
TABLET, FILM COATED, EXTENDED RELEASE ORAL
Qty: 180 TABLET | Refills: 0 | Status: SHIPPED | OUTPATIENT
Start: 2024-07-26

## 2024-07-29 ENCOUNTER — OFFICE VISIT (OUTPATIENT)
Dept: WOUND CARE | Facility: CLINIC | Age: 44
End: 2024-07-29
Payer: COMMERCIAL

## 2024-07-29 PROCEDURE — 99213 OFFICE O/P EST LOW 20 MIN: CPT

## 2024-08-09 ENCOUNTER — APPOINTMENT (OUTPATIENT)
Dept: WOUND CARE | Facility: CLINIC | Age: 44
End: 2024-08-09
Payer: COMMERCIAL

## 2024-11-01 DIAGNOSIS — I10 PRIMARY HYPERTENSION: ICD-10-CM

## 2024-11-01 DIAGNOSIS — E11.65 TYPE 2 DIABETES MELLITUS WITH HYPERGLYCEMIA, WITHOUT LONG-TERM CURRENT USE OF INSULIN: ICD-10-CM

## 2024-11-03 RX ORDER — AMLODIPINE BESYLATE 10 MG/1
TABLET ORAL
Qty: 90 TABLET | Refills: 0 | Status: SHIPPED | OUTPATIENT
Start: 2024-11-03

## 2024-11-03 RX ORDER — LISINOPRIL AND HYDROCHLOROTHIAZIDE 20; 25 MG/1; MG/1
TABLET ORAL
Qty: 90 TABLET | Refills: 0 | Status: SHIPPED | OUTPATIENT
Start: 2024-11-03

## 2024-11-03 RX ORDER — GLIPIZIDE 10 MG/1
TABLET, FILM COATED, EXTENDED RELEASE ORAL
Qty: 180 TABLET | Refills: 0 | Status: SHIPPED | OUTPATIENT
Start: 2024-11-03

## 2024-11-03 RX ORDER — METFORMIN HYDROCHLORIDE 500 MG/1
1000 TABLET, EXTENDED RELEASE ORAL
Qty: 360 TABLET | Refills: 0 | Status: SHIPPED | OUTPATIENT
Start: 2024-11-03

## 2025-01-04 ENCOUNTER — OFFICE VISIT (OUTPATIENT)
Dept: URGENT CARE | Age: 45
End: 2025-01-04
Payer: COMMERCIAL

## 2025-01-04 VITALS
DIASTOLIC BLOOD PRESSURE: 85 MMHG | HEART RATE: 93 BPM | RESPIRATION RATE: 18 BRPM | TEMPERATURE: 97.6 F | SYSTOLIC BLOOD PRESSURE: 126 MMHG | OXYGEN SATURATION: 95 %

## 2025-01-04 DIAGNOSIS — R05.1 ACUTE COUGH: ICD-10-CM

## 2025-01-04 DIAGNOSIS — J20.9 ACUTE BRONCHITIS, UNSPECIFIED ORGANISM: Primary | ICD-10-CM

## 2025-01-04 LAB
POC RAPID INFLUENZA A: NEGATIVE
POC RAPID INFLUENZA B: NEGATIVE
POC SARS-COV-2 AG BINAX: NORMAL

## 2025-01-04 RX ORDER — BENZONATATE 200 MG/1
200 CAPSULE ORAL 3 TIMES DAILY PRN
Qty: 30 CAPSULE | Refills: 0 | Status: SHIPPED | OUTPATIENT
Start: 2025-01-04 | End: 2025-01-11

## 2025-01-04 ASSESSMENT — ENCOUNTER SYMPTOMS
COUGH: 1
SHORTNESS OF BREATH: 0
FEVER: 0
WHEEZING: 0
SORE THROAT: 0
RHINORRHEA: 1
CHEST TIGHTNESS: 0
CHILLS: 0

## 2025-01-04 NOTE — PROGRESS NOTES
Subjective   Patient ID: Roby Caldwell is a 44 y.o. male. They present today with a chief complaint of Generalized Body Aches, Cough, and Nasal Congestion (X 4 days ).    History of Present Illness  Patient presents for illness symptoms that started four days ago including cough, nasal congestion, runny nose, voice hoarseness and body aches at times period he states he was feeling better though the cough worsened again last night. He denies any fevers, chills kind of sore throat, ear pain, chest tightness, wheezing and shortness of breath. He denies history of asthma.      Cough  Associated symptoms include rhinorrhea. Pertinent negatives include no chills, ear pain, fever, sore throat, shortness of breath or wheezing.       Past Medical History  Allergies as of 01/04/2025    (No Known Allergies)       (Not in a hospital admission)       Past Medical History:   Diagnosis Date    Diabetes mellitus (Multi)     Hypertension     Unspecified lack of expected normal physiological development in childhood 06/05/2017    Developmental delay       Past Surgical History:   Procedure Laterality Date    CYST REMOVAL      From scalp    DENTAL SURGERY      LASIK Bilateral         reports that he has never smoked. He has never used smokeless tobacco. He reports that he does not drink alcohol and does not use drugs.    Review of Systems  Review of Systems   Constitutional:  Negative for chills and fever.   HENT:  Positive for congestion and rhinorrhea. Negative for ear discharge, ear pain and sore throat.    Respiratory:  Positive for cough. Negative for chest tightness, shortness of breath and wheezing.                                   Objective    Vitals:    01/04/25 0936   BP: 126/85   Pulse: 93   Resp: 18   Temp: 36.4 °C (97.6 °F)   SpO2: 95%     No LMP for male patient.    Physical Exam  Vitals reviewed.   Constitutional:       General: He is not in acute distress.     Appearance: Normal appearance. He is not  ill-appearing.   HENT:      Right Ear: Tympanic membrane, ear canal and external ear normal.      Left Ear: Tympanic membrane, ear canal and external ear normal.      Nose: Congestion and rhinorrhea present.      Mouth/Throat:      Pharynx: No pharyngeal swelling, oropharyngeal exudate or posterior oropharyngeal erythema.      Tonsils: No tonsillar exudate or tonsillar abscesses.   Cardiovascular:      Rate and Rhythm: Normal rate and regular rhythm.      Pulses: Normal pulses.      Heart sounds: Normal heart sounds.   Pulmonary:      Effort: Pulmonary effort is normal. No respiratory distress.      Breath sounds: Normal breath sounds. No wheezing, rhonchi or rales.   Lymphadenopathy:      Cervical: Cervical adenopathy present.   Neurological:      Mental Status: He is alert and oriented to person, place, and time.         Procedures    Point of Care Test & Imaging Results from this visit  Results for orders placed or performed in visit on 01/04/25   POCT Influenza A/B manually resulted   Result Value Ref Range    POC Rapid Influenza A Negative Negative    POC Rapid Influenza B Negative Negative   POCT Covid-19 Rapid Antigen   Result Value Ref Range    POC ROSSY-COV-2 AG  Presumptive negative test for SARS-CoV-2 (no antigen detected)     Presumptive negative test for SARS-CoV-2 (no antigen detected)      No results found.    Diagnostic study results (if any) were reviewed by Felicia Marsh PA-C.    Assessment/Plan   Allergies, medications, history, and pertinent labs/EKGs/Imaging reviewed by Felicia Marsh PA-C.     Medical Decision Making  MDM: History and examination consistent with viral bronchitis. Rapid COVID and influenza were negative. Discussed there are no signs of pneumonia, sinusitis, otitis or other bacterial etiology. Plan at this time is supportive measures and symptomatic care at home. Advised to go to ER if worsens, otherwise follow with pcp. Patient verbalized understanding and agrees with plan.       Orders and Diagnoses  Diagnoses and all orders for this visit:  Acute bronchitis, unspecified organism  -     benzonatate (Tessalon) 200 mg capsule; Take 1 capsule (200 mg) by mouth 3 times a day as needed for cough for up to 7 days. Do not crush or chew.  Acute cough  -     POCT Influenza A/B manually resulted  -     POCT Covid-19 Rapid Antigen      Medical Admin Record      Patient disposition: Home    Electronically signed by Felicia Marsh PA-C  9:56 AM

## 2025-01-21 ENCOUNTER — OFFICE VISIT (OUTPATIENT)
Dept: URGENT CARE | Age: 45
End: 2025-01-21
Payer: COMMERCIAL

## 2025-01-21 ENCOUNTER — ANCILLARY PROCEDURE (OUTPATIENT)
Dept: URGENT CARE | Age: 45
End: 2025-01-21
Payer: COMMERCIAL

## 2025-01-21 VITALS — SYSTOLIC BLOOD PRESSURE: 149 MMHG | DIASTOLIC BLOOD PRESSURE: 81 MMHG | HEART RATE: 93 BPM | OXYGEN SATURATION: 95 %

## 2025-01-21 DIAGNOSIS — T14.8XXA CRUSHING INJURY: ICD-10-CM

## 2025-01-21 DIAGNOSIS — E11.65 TYPE 2 DIABETES MELLITUS WITH HYPERGLYCEMIA, WITHOUT LONG-TERM CURRENT USE OF INSULIN: ICD-10-CM

## 2025-01-21 DIAGNOSIS — M79.674 PAIN IN TOE OF RIGHT FOOT: ICD-10-CM

## 2025-01-21 DIAGNOSIS — M79.674 GREAT TOE PAIN, RIGHT: ICD-10-CM

## 2025-01-21 DIAGNOSIS — I10 HYPERTENSION, UNSPECIFIED TYPE: ICD-10-CM

## 2025-01-21 DIAGNOSIS — S90.211A SUBUNGUAL HEMATOMA OF GREAT TOE OF RIGHT FOOT, INITIAL ENCOUNTER: Primary | ICD-10-CM

## 2025-01-21 PROCEDURE — 73630 X-RAY EXAM OF FOOT: CPT | Mod: RIGHT SIDE | Performed by: NURSE PRACTITIONER

## 2025-01-22 NOTE — PATIENT INSTRUCTIONS
Subungual hematoma right great toe-released with trephination  Xray negative for fracture    NO neurovascular compromise or bony injury at this time. Treatment is supportive measures,  REST, ICE, ELEVATE, Supportive shoe  Tylenol/Motrin for pain.    PCP follow up. ED if worsens    Has diabetes, discussed importance of monitoring foot, any worsening, change in color, right great toe/nail not healing follow up with PCP/go to ER

## 2025-01-22 NOTE — PROGRESS NOTES
Subjective   Patient ID: Roby Caldwell is a 44 y.o. male. They present today with a chief complaint of Injury (Dropped log on Right great toe ).  Great toenail subungual hemotoma.    Past Medical History  Allergies as of 01/21/2025    (No Known Allergies)       (Not in a hospital admission)       Past Medical History:   Diagnosis Date    Diabetes mellitus (Multi)     Hypertension     Unspecified lack of expected normal physiological development in childhood 06/05/2017    Developmental delay       Past Surgical History:   Procedure Laterality Date    CYST REMOVAL      From scalp    DENTAL SURGERY      LASIK Bilateral         reports that he has never smoked. He has never used smokeless tobacco. He reports that he does not drink alcohol and does not use drugs.                             Objective    Vitals:    01/21/25 1335   BP: 149/81   Pulse: 93   SpO2: 95%     No LMP for male patient.    Physical Exam  Constitutional:       Appearance: Normal appearance. He is obese.   Cardiovascular:      Pulses: Normal pulses.   Pulmonary:      Effort: Pulmonary effort is normal.   Musculoskeletal:      Right foot: Normal range of motion and normal capillary refill. Tenderness (tenderness distal phalanx) and bony tenderness present. No swelling, deformity, bunion, foot drop or laceration. Normal pulse.   Skin:     General: Skin is warm and dry.      Capillary Refill: Capillary refill takes less than 2 seconds.      Comments: Right great toenail subungual hematoma   Neurological:      General: No focal deficit present.      Mental Status: He is alert and oriented to person, place, and time.   Psychiatric:         Mood and Affect: Mood normal.         Behavior: Behavior normal.         Thought Content: Thought content normal.         Judgment: Judgment normal.         Incision and Drainage    Date/Time: 1/21/2025 7:33 PM    Performed by: KIMMY Castrejon  Authorized by: KIMMY Castrejon    Consent:     Consent  obtained:  Verbal and written    Consent given by:  Patient    Risks discussed:  Bleeding, incomplete drainage and pain    Alternatives discussed:  No treatment and delayed treatment  Universal protocol:     Patient identity confirmed:  Verbally with patient  Location:     Type:  Subungual hematoma    Location:  Lower extremity    Lower extremity location:  Toe    Toe location:  R big toe  Pre-procedure details:     Skin preparation:  Antiseptic wash (alcohol prep)  Sedation:     Sedation type:  None  Anesthesia:     Anesthesia method:  None  Post-procedure details:     Procedure completion:  Tolerated well, no immediate complications      Point of Care Test & Imaging Results from this visit  No results found for this visit on 01/21/25.   XR foot right 3+ views    Result Date: 1/21/2025  Interpreted By:  Hong Yip, STUDY: XR FOOT RIGHT 3+ VIEWS  1/21/2025 2:12 pm   INDICATION: Signs/Symptoms:pain right toe   COMPARISON: 01/21/2025   ACCESSION NUMBER(S): [id number]   ORDERING CLINICIAN: FRANCISCO J COVINGTON   TECHNIQUE: Three views of the right foot including AP and oblique projections were obtained.   FINDINGS: There is no radiographic evidence of acute fracture or dislocation identified. The joint spaces are well preserved throughout without significant degenerative changes.       1. No acute fracture or dislocation identified.   MACRO: None.   Signed by: Hong Yip 1/21/2025 2:29 PM Dictation workstation:   [id number]     Diagnostic study results (if any) were reviewed by KIMMY Castrejon.    Assessment/Plan   Allergies, medications, history, and pertinent labs/EKGs/Imaging reviewed by KIMMY Castrejon.     Medical Decision Making  Subungual hematoma right great toe-released with trephination  Xray negative for fracture    NO neurovascular compromise or bony injury at this time. Treatment is supportive measures,  REST, ICE, ELEVATE, Supportive shoe  Tylenol/Motrin for pain.    PCP follow up. ED  if worsens    Has diabetes, discussed importance of monitoring foot, any worsening, change in color, right great toe/nail not healing follow up with PCP/go to ER    Orders and Diagnoses  Diagnoses and all orders for this visit:  Subungual hematoma of great toe of right foot, initial encounter  Great toe pain, right  Crushing injury      Medical Admin Record      Patient disposition: Home    Electronically signed by KIMMY Castrejon  7:28 PM

## 2025-02-09 DIAGNOSIS — E11.65 TYPE 2 DIABETES MELLITUS WITH HYPERGLYCEMIA, WITHOUT LONG-TERM CURRENT USE OF INSULIN: ICD-10-CM

## 2025-02-09 DIAGNOSIS — I10 PRIMARY HYPERTENSION: ICD-10-CM

## 2025-02-09 NOTE — PROGRESS NOTES
[facility]    Patient Name: Roby Caldwell  Patient : 1980      Scribe Attestation  By signing my name below, I, Hugo Truong   attest that this documentation has been prepared under the direction and in the presence of Geovanny Edouard DO.      Reason for visit:  This is a 44-year-old male here for follow-up regarding coronary artery calcification, hypertension, dyslipidemia, diabetes mellitus, bilateral lower extremity edema, obstructive sleep apnea, and severe obesity.      HPI:  This is a 44-year-old male here for follow-up regarding coronary artery calcification, hypertension, dyslipidemia, diabetes mellitus, bilateral lower extremity edema, obstructive sleep apnea, and severe obesity.  The patient was last evaluated by me in 2024.  At that visit he was doing reasonably well and I asked that he follow-up in 1 year. ECG done today showed sinus rhythm with a heart rate of 90 bpm and LVH. The patient reports that he has been feeling generally well from the cardiac standpoint. He denies any new chest pain, shortness of breath, palpitations and lightheadedness. He states that he takes all of his medications as prescribed. During my exam, he was resting comfortably on the exam table.             Assessment/Plan:   1. Coronary artery calcification  The patient has a history of coronary calcification with a total score of 433.63.   ECG done today showed sinus rhythm with a heart rate of 90 bpm and LVH.  He denies anginal chest discomfort.  Blood pressure appears controlled on exam today.  He should continue current antihypertensive medications.  Echocardiogram done 2023 showed normal left ventricular systolic function with an ejection fraction of 60 to 65%, normal right ventricular systolic function, no significant valve abnormalities.   Lipid panel done in 2024 showed an LDL cholesterol of 70 and triglycerides of 94 while on  atorvastatin 80 mg daily.   Lab works were ordered as noted below.   Please see lifestyle recommendations below.  Follow up in 1 year and sooner if necessary.      2. Hypertension  The patient has a history of hypertension which appears controlled on exam today.  He should continue his current antihypertensive medications and monitor his blood pressure at home.      3. Dyslipidemia  Lipid panel done in February 2024 showed an LDL cholesterol of 70 and triglycerides of 94 while on atorvastatin 80 mg daily.   Lipid panel was ordered as noted below.  Please see lifestyle recommendations below.     4. Diabetes Mellitus  Hemoglobin A1c done in February 2024 was 7.1%.  Medication management as per PCP.     5. Bilateral lower extremity edema  The patient has trace pitting bilateral lower extremity edema on exam today.  I discussed with him the importance of following a low-sodium heart healthy diet as well as weight loss, wearing compression stockings and elevating legs when seated.      6. Obstructive sleep apnea  Stable on new CPAP machine.  Management as per PCP/Sleep Medicine.    7. Severe obesity  Please see lifestyle recommendations below.       Orders:   CMP/lipid/magnesium/CBC,   Follow-up in 1 year.    Lifestyle Recommendations  I recommend a whole-food plant-based diet, an eating pattern that encourages the consumption of unrefined plant foods (such as fruits, vegetables, tubers, whole grains, legumes, nuts and seeds) and discourages meats, dairy products, eggs and processed foods.     The AHA/ACC recommends that the patient consume a dietary pattern that emphasizes intake of vegetables, fruits, and whole grains; includes low-fat dairy products, poultry, fish, legumes, non-tropical vegetable oils, and nuts; and limits intake of sodium, sweets, sugar-sweetened beverages, and red meats.  Adapt this dietary pattern to appropriate calorie requirements (a 500-750 kcal/day deficit to loose weight), personal and  cultural food preferences, and nutrition therapy for other medical conditions (including diabetes).  Achieve this pattern by following plans such as the [place] Mediterranean, DASH dietary pattern, or AHA diet.     Engage in 2 hours and 30 minutes per week of moderate-intensity physical activity, or 1 hour and 15 minutes (75 minutes) per week of vigorous-intensity aerobic physical activity, or an equivalent combination of moderate and vigorous-intensity aerobic physical activity. Aerobic activity should be performed in episodes of at least 10 minutes preferably spread throughout the week.     Adhering to a heart healthy diet, regular exercise habits, avoidance of tobacco products, and maintenance of a healthy weight are crucial components of their heart disease risk reduction.     Any positive review of systems not specifically addressed in the office visit today should be evaluated and treated by the patients primary care physician or in an emergency department if necessary     Patient was notified that results from ordered tests will be called to the patient if it changes current management; it will otherwise be discussed at a future appointment and available on  Wantr.     Thank you for allowing me to participate in the care of this patient.        This document was generated using the assistance of voice recognition software. If there are any errors of spelling, grammar, syntax, or meaning; please feel free to contact me directly for clarification.    Past Medical History:  He has a past medical history of Diabetes mellitus (Multi), Hypertension, and Unspecified lack of expected normal physiological development in childhood (06/05/2017).    Past Surgical History:  He has a past surgical history that includes Cyst Removal; Dental surgery; and LASIK (Bilateral).      Social History:  He reports that he has never smoked. He has never used smokeless tobacco. He reports that he does not drink alcohol and does not  "use drugs.    Family History:  Family History   Problem Relation Name Age of Onset    Hypothyroidism Mother      Graves' disease Mother      Prostate cancer Father Fabrizio Caldwell     Hypertension Father Fabrizio Caldwell     Cancer Father Fabrizio Caldwell     No Known Problems Brother          Allergies:  Patient has no known allergies.    Outpatient Medications:  Current Outpatient Medications   Medication Instructions    amLODIPine (Norvasc) 10 mg tablet TAKE ONE TABLET BY MOUTH EVERY DAY    atorvastatin (LIPITOR) 80 mg, oral, Nightly    cholecalciferol (Vitamin D-3) 50 mcg (2,000 unit) capsule 1 capsule, oral, Daily    escitalopram (Lexapro) 10 mg tablet TAKE ONE TABLET BY MOUTH EVERY DAY after eating    ferrous sulfate 325 (65 Fe) MG tablet 1 tablet, oral, Daily    fish oil concentrate (Omega-3) 120-180 mg capsule 1,000 mg, oral    glipiZIDE XL (Glucotrol XL) 10 mg 24 hr tablet take 1 tablet by mouth daily with breakfast and dinner    lisinopriL-hydrochlorothiazide 20-25 mg tablet TAKE ONE TABLET BY MOUTH DAILY    melatonin 3 mg, oral    metFORMIN XR (GLUCOPHAGE-XR) 1,000 mg, oral, 2 times daily (morning and late afternoon)        ROS:  A 14 point review of systems was done and is negative other than as stated in HPI    Vitals:      2/2/2024    11:35 AM 2/6/2024     3:55 PM 3/9/2024     1:03 PM 3/28/2024    10:03 AM 7/27/2024    10:31 AM 1/4/2025     9:36 AM 1/21/2025     1:35 PM   Vitals   Systolic 118 138 124 144 128 126 149   Diastolic 76 86 80 83 80 85 81   Heart Rate 96 91 96 89 78 93 93   Temp 36.5 °C (97.7 °F)  36.3 °C (97.4 °F) 36.6 °C (97.9 °F) 36.6 °C (97.9 °F) 36.4 °C (97.6 °F)    Resp   16 16 16 18    Height  1.854 m (6' 1\")  1.854 m (6' 0.99\")      Weight (lb) 396.4 366.2  391.98      BMI 52.88 kg/m2 48.31 kg/m2  51.73 kg/m2      BSA (m2) 3.04 m2 2.92 m2  3.03 m2           Physical Exam:   Constitutional: Cooperative, in no acute distress, alert, appears stated age.   Skin: Skin color, texture, turgor " normal. No rashes or lesions.   Head: Normocephalic. No masses, lesions, tenderness or abnormalities   Eyes: Extraocular movements are grossly intact.   Mouth and throat: Mucous membranes moist   Neck: Neck supple, no carotid bruits, no JVD   Respiratory: Lungs clear to auscultation, no wheezing or rhonchi, no use of accessory muscles   Chest wall: No scars, normal excursion with respiration   Cardiovascular: Regular rhythm, no murmur  Gastrointestinal: Abdomen soft, nontender. Bowel sounds normal. Morbidly obese.  Musculoskeletal: Strength equal in upper extremities   Extremities: Trace pitting edema   Neurologic: Sensation grossly intact, alert and oriented x3    Intake/Output:   No intake/output data recorded.    Outpatient Medications  Current Outpatient Medications on File Prior to Visit   Medication Sig Dispense Refill    amLODIPine (Norvasc) 10 mg tablet TAKE ONE TABLET BY MOUTH EVERY DAY 90 tablet 0    atorvastatin (Lipitor) 80 mg tablet Take 1 tablet (80 mg) by mouth once daily at bedtime. 90 tablet 3    cholecalciferol (Vitamin D-3) 50 mcg (2,000 unit) capsule Take 1 capsule (50 mcg) by mouth once daily.      escitalopram (Lexapro) 10 mg tablet TAKE ONE TABLET BY MOUTH EVERY DAY after eating 90 tablet 3    ferrous sulfate 325 (65 Fe) MG tablet Take 1 tablet by mouth once daily.      fish oil concentrate (Omega-3) 120-180 mg capsule Take 1 capsule (1,000 mg) by mouth.      glipiZIDE XL (Glucotrol XL) 10 mg 24 hr tablet take 1 tablet by mouth daily with breakfast and dinner 180 tablet 0    lisinopriL-hydrochlorothiazide 20-25 mg tablet TAKE ONE TABLET BY MOUTH DAILY 90 tablet 0    melatonin 3 mg capsule Take 3 mg by mouth.      metFORMIN  mg 24 hr tablet take 2 tablets by mouth twice daily with morning and evening meals 360 tablet 0     No current facility-administered medications on file prior to visit.       Labs: (past 26 weeks)  Recent Results (from the past 26 weeks)   POCT Covid-19 Rapid Antigen     Collection Time: 01/04/25  9:44 AM   Result Value Ref Range    POC ROSSY-COV-2 AG  Presumptive negative test for SARS-CoV-2 (no antigen detected)     Presumptive negative test for SARS-CoV-2 (no antigen detected)   POCT Influenza A/B manually resulted    Collection Time: 01/04/25  9:45 AM   Result Value Ref Range    POC Rapid Influenza A Negative Negative    POC Rapid Influenza B Negative Negative       ECG  No results found for this or any previous visit (from the past 4464 hours).    Echocardiogram  No results found for this or any previous visit from the past 1095 days.      CV Studies:  EKG: No results found for this or any previous visit (from the past 4464 hours).  Echocardiogram:   [place] [address]  Phone [phone] Fax [phone]    TRANSTHORACIC ECHOCARDIOGRAM REPORT      Patient Name:     BETINA MURPHY        Emma Physician:  [id number] Maricel BERNAL MD  Study Date:       1/23/2023       Referring           MARISSA ORDONEZ  Physician:  MRN/PID:          [id number]        PCP:  Accession/Order#: [id number]    [facility]  Location:  YOB: 1980       Fellow:  Gender:           M               Nurse:              Lluvia Rincon RN PRN  Admit Date:                       Sonographer:        Liza Brice  Admission Status: Outpatient      Additional Staff:  Height:           182.88 cm       CC Report to:  Weight:           174.63 kg       Study Type:         Echocardiogram  BSA:              2.81 m2  Blood Pressure: 132 /74 mmHg    Diagnosis/ICD: I25.10-Atherosclerotic heart disease of native coronary artery  without angina pectoris; I10-Essential (primary) hypertension  Indication:    Coronary artery calcification, HTN  Procedure/CPT: Echo Complete w Full Doppler-[id number]  Study Detail: The following Echo studies were performed: 2D, M-Mode, Doppler  and  color flow. Technically challenging study due to poor acoustic  windows and body habitus. Optison used as a contrast agent for  endocardial border definition.      PHYSICIAN INTERPRETATION:  Left Ventricle: Left ventricular systolic function is normal, with an estimated ejection fraction of 60-65%. There are no regional wall motion abnormalities. The left ventricular cavity size is normal. Left ventricular diastolic filling was indeterminate.  Left Atrium: The left atrium is moderately dilated.  Right Ventricle: The right ventricle is normal in size. There is normal right ventricular global systolic function.  Right Atrium: The right atrium is normal in size.  Aortic Valve: The aortic valve was not well visualized. There is no evidence of aortic valve regurgitation. The peak instantaneous gradient of the aortic valve is 12.5 mmHg. The mean gradient of the aortic valve is 7.4 mmHg.  Mitral Valve: The mitral valve is normal in structure. There is no evidence of mitral valve regurgitation.  Tricuspid Valve: The tricuspid valve is structurally normal. There is trace tricuspid regurgitation. The Doppler estimated RVSP is mildly elevated at 39.5 mmHg.  Pulmonic Valve: The pulmonic valve is not well visualized. The pulmonic valve regurgitation was not well visualized.  Pericardium: There is no pericardial effusion noted.  Aorta: The aortic root is normal.      CONCLUSIONS:  1. Left ventricular systolic function is normal with a 60-65% estimated ejection fraction.  2. The left atrium is moderately dilated.  3. Mildly elevated RVSP.    QUANTITATIVE DATA SUMMARY:  2D MEASUREMENTS:  Normal Ranges:  LAs: 3.56 cm (2.7-4.0cm)    LA VOLUME:  Normal Ranges:  LA Vol A4C:        106.0 ml   (22+/-6mL/m2)  LA Vol A2C:        89.8 ml  LA Vol BP:         98.3 ml  LA Vol Index A4C:  37.7 ml/m2  LA Vol Index A2C:  31.9 ml/m2  LA Vol Index BP:   34.9 ml/m2  LA Area A4C:       28.9 cm2  LA Area A2C:       26.4 cm2  LA Major Axis A4C:  6.7 cm  LA Major Axis A2C: 6.6 cm  LA Vol A4C:        98.5 ml  LA Vol A2C:        85.7 ml    RA VOLUME BY A/L METHOD:  Normal Ranges:  RA Area A4C: 18.4 cm2    AORTA MEASUREMENTS:  Normal Ranges:  Asc Ao, d: 3.30 cm (2.1-3.4cm)    LV SYSTOLIC FUNCTION BY 2D PLANIMETRY (MOD):  Normal Ranges:  EF-A4C View: 60.8 % (>=55%)  EF-A2C View: 73.1 %  EF-Biplane:  68.0 %    LV DIASTOLIC FUNCTION:  Normal Ranges:  MV Peak E:    1.15 m/s    (0.7-1.2 m/s)  MV Peak A:    0.71 m/s    (0.42-0.7 m/s)  E/A Ratio:    1.63        (1.0-2.2)  MV e'         0.10 m/s    (>8.0)  MV lateral e' 0.09 m/s  MV medial e'  0.10 m/s  MV A Dur:     139.87 msec  E/e' Ratio:   12.14       (<8.0)    MITRAL VALVE:  Normal Ranges:  MV DT: 158 msec (150-240msec)    AORTIC VALVE:  Normal Ranges:  AoV Vmax:                1.77 m/s  (<=1.7m/s)  AoV Peak P.5 mmHg (<20mmHg)  AoV Mean P.4 mmHg  (1.7-11.5mmHg)  LVOT Max Uriel:            1.60 m/s  (<=1.1m/s)  AoV VTI:                 33.17 cm  (18-25cm)  LVOT VTI:                29.04 cm  LVOT Diameter:           1.91 cm   (1.8-2.4cm)  AoV Area, VTI:           2.51 cm2  (2.5-5.5cm2)  AoV Area,Vmax:           2.59 cm2  (2.5-4.5cm2)  AoV Dimensionless Index: 0.88    RIGHT VENTRICLE:  RV 1   4.0 cm  RV 2   2.8 cm  RV 3   8.0 cm  TAPSE: 30.5 mm  RV s'  0.12 m/s    TRICUSPID VALVE/RVSP:  Normal Ranges:  Peak TR Velocity: 2.47 m/s  RV Syst Pressure: 39.5 mmHg (< 30mmHg)  TV E Vmax:        0.52 m/s  (0.3-0.7m/s)  TV A Vmax:        0.54 m/s  IVC Diam:         2.99 cm  TV e'             0.1 m/s    AORTA:  Asc Ao Diam 3.31 cm      [id number] Maricel Wells MD  Electronically signed on 2023 at 11:41:02 AM         Final     Stress Testing IMGRESULT([id number]:1:1825):   NM CARDIAC STRESS REST (MYOCARDIAL PERFUSION MIBI) 2023    Narrative  MRN: [id number]  Patient Name: BETINA BERNAL    STUDY:  CARDIAC STRESS/REST INJECTION;  2023 11:35 am    INDICATION:  Elevated calcium score   E78.5: Hyperlipidemia I10: Hypertension  E66.01: Morbid obesity with body mass index (BMI) of 40.0 or higher.    COMPARISON:  None.    ACCESSION NUMBER(S):  [id number]    ORDERING CLINICIAN:  MARISSA ORDONEZ    TECHNIQUE:  DIVISION OF NUCLEAR MEDICINE  STRESS MYOCARDIAL PERFUSION SCAN, ONE DAY PROTOCOL    The patient received an intravenous dose of  10.8 mCi of Tc-99m  tetrofosmin and resting emission tomographic (SPECT) images of the  myocardium were acquired. The patient then exercised via treadmill  stress to  85 % of MPHR and achieved  7 METS. At peak stress  33.8  mCi of Tc-99m tetrofosmin were administered and stress phase SPECT  images of the myocardium were then acquired. These included ECG-gated  images to assess and quantify ventricular function.    FINDINGS:    There is normal perfusion in all major segments. There is normal wall  motion and normal left ventricular function . The gated ejection  fraction is  61%(Normal over 50%).    Impression  Normal exercise stress myocardial perfusion imaging.    Cardiac Catheterization: No results found for this or any previous visit from the past 1825 days.  No results found for this or any previous visit from the past 3650 days.     Cardiac Scoring:   CT HEART CALCIUM SCORING WO IV CONTRAST 05/26/2022    Narrative  MRN: [id number]  Patient Name: BETINA BERNAL    STUDY:  CT CARDIAC SCORING;  5/26/2022 8:07 am    INDICATION:  HLD; Hypertriglyceridemia; Class 3 Obesity; CAD per CT Cardiac Score  5/25/17  E78.5: Hyperlipidemia I25.10: CAD (coronary artery disease)  E66.01: Class 3 severe obesity due to excess calories with serious  comorbidity and body mass index (BMI) of.    COMPARISON:  05/26/2017    ACCESSION NUMBER(S):  [id number]    ORDERING CLINICIAN:  GERARDO FORBES    TECHNIQUE:  Using prospective ECG gating, CT scan of the coronary arteries was  performed without intravenous contrast. Coronary calcium scoring  was  performed according to the method of  "[place].    FINDINGS:  The score and distribution of calcium in the coronary arteries is as  follows:    LM 36.44 ,  .44,  LCx 6.02,  RCA 87.73,    Total 433.63    The visualized mid/lower ascending thoracic aorta measures 3.4 cm in  diameter. The heart is normal in size. No pericardial effusion is  present.    No gross evidence of mediastinal or hilar lymphadenopathy or masses  is identified. The visualized segments of the lungs are normally  expanded.    The visualized subdiaphragmatic structures appear intact.    Impression  1. Coronary artery calcium score of 433.63*. This is compared to a  prior calcium score of 122.22 On 05/26/2017.    *Coronary artery calcium scoring may be helpful in predicting the  risk for future coronary heart disease events.  According to the  American College of Cardiology Foundation Clinical Expert Consensus  Task Force, such testing provides important prognostic information in  patients with more than one coronary heart disease risk factor. The  coronary artery calcium score correlates with the annual risk of a  non-fatal myocardial infarction or coronary heart disease death.    Coronary artery score            Annual Risk    0-99                             0.4%  100-399                        1.3%  >400                            2.4%    These three \"breakpoints\" correspond to lower, intermediate and high  risk states for future coronary events.  Such information should be  used, along with appropriate clinical judgment, to make decisions  regarding the intensity of risk factor management strategies to treat  blood lipids and to modify other non-lipid coronary risk factors.    Reference: [place] P et al. Circulation.  2007; 115:402-426    AAA : No results found for this or any previous visit from the past 1825 days.    OTHER: No results found for this or any previous visit from the past 1825 days.    LAST IMAGING RESULTS  XR foot right 3+ views  Narrative: Interpreted By:  " Hong Yip,   STUDY:  XR FOOT RIGHT 3+ VIEWS  1/21/2025 2:12 pm      INDICATION:  Signs/Symptoms:pain right toe      COMPARISON:  01/21/2025      ACCESSION NUMBER(S):  [id number]      ORDERING CLINICIAN:  FRANCISCO J COVINGTON      TECHNIQUE:  Three views of the right foot including AP and oblique projections  were obtained.      FINDINGS:  There is no radiographic evidence of acute fracture or dislocation  identified. The joint spaces are well preserved throughout without  significant degenerative changes.      Impression: 1. No acute fracture or dislocation identified.      MACRO:  None.      Signed by: Hong Yip 1/21/2025 2:29 PM  Dictation workstation:   [id number]      Problem List Items Addressed This Visit       CAD (coronary artery disease) - Primary    Hyperlipidemia    Hypertension    Severe obesity (BMI >= 40) (Multi)    Severe obstructive sleep apnea    Type 2 diabetes mellitus with hyperglycemia, without long-term current use of insulin    Bilateral edema of lower extremity         Geovanny Edouard DO, FACC, FACOI

## 2025-02-10 RX ORDER — METFORMIN HYDROCHLORIDE 500 MG/1
1000 TABLET, EXTENDED RELEASE ORAL
Qty: 360 TABLET | Refills: 0 | Status: SHIPPED | OUTPATIENT
Start: 2025-02-10

## 2025-02-10 RX ORDER — GLIPIZIDE 10 MG/1
TABLET, FILM COATED, EXTENDED RELEASE ORAL
Qty: 180 TABLET | Refills: 0 | Status: SHIPPED | OUTPATIENT
Start: 2025-02-10

## 2025-02-10 RX ORDER — AMLODIPINE BESYLATE 10 MG/1
TABLET ORAL
Qty: 90 TABLET | Refills: 0 | Status: SHIPPED | OUTPATIENT
Start: 2025-02-10

## 2025-02-10 RX ORDER — LISINOPRIL AND HYDROCHLOROTHIAZIDE 20; 25 MG/1; MG/1
TABLET ORAL
Qty: 90 TABLET | Refills: 0 | Status: SHIPPED | OUTPATIENT
Start: 2025-02-10

## 2025-02-11 ENCOUNTER — APPOINTMENT (OUTPATIENT)
Dept: CARDIOLOGY | Facility: CLINIC | Age: 45
End: 2025-02-11
Payer: COMMERCIAL

## 2025-02-11 VITALS
SYSTOLIC BLOOD PRESSURE: 128 MMHG | BODY MASS INDEX: 53.16 KG/M2 | HEIGHT: 72 IN | DIASTOLIC BLOOD PRESSURE: 78 MMHG | HEART RATE: 90 BPM

## 2025-02-11 DIAGNOSIS — I25.10 CORONARY ARTERY DISEASE INVOLVING NATIVE CORONARY ARTERY OF NATIVE HEART WITHOUT ANGINA PECTORIS: Primary | ICD-10-CM

## 2025-02-11 DIAGNOSIS — E11.65 TYPE 2 DIABETES MELLITUS WITH HYPERGLYCEMIA, WITHOUT LONG-TERM CURRENT USE OF INSULIN: ICD-10-CM

## 2025-02-11 DIAGNOSIS — R60.0 BILATERAL EDEMA OF LOWER EXTREMITY: ICD-10-CM

## 2025-02-11 DIAGNOSIS — I10 PRIMARY HYPERTENSION: ICD-10-CM

## 2025-02-11 DIAGNOSIS — E78.00 PURE HYPERCHOLESTEROLEMIA: ICD-10-CM

## 2025-02-11 DIAGNOSIS — E66.01 SEVERE OBESITY (BMI >= 40) (MULTI): ICD-10-CM

## 2025-02-11 DIAGNOSIS — I25.10 CORONARY ARTERY CALCIFICATION: ICD-10-CM

## 2025-02-11 DIAGNOSIS — G47.33 SEVERE OBSTRUCTIVE SLEEP APNEA: ICD-10-CM

## 2025-02-11 PROCEDURE — 3074F SYST BP LT 130 MM HG: CPT | Performed by: INTERNAL MEDICINE

## 2025-02-11 PROCEDURE — 93000 ELECTROCARDIOGRAM COMPLETE: CPT | Performed by: INTERNAL MEDICINE

## 2025-02-11 PROCEDURE — 1036F TOBACCO NON-USER: CPT | Performed by: INTERNAL MEDICINE

## 2025-02-11 PROCEDURE — 3078F DIAST BP <80 MM HG: CPT | Performed by: INTERNAL MEDICINE

## 2025-02-11 PROCEDURE — 99214 OFFICE O/P EST MOD 30 MIN: CPT | Performed by: INTERNAL MEDICINE

## 2025-03-20 ENCOUNTER — TELEPHONE (OUTPATIENT)
Dept: CARDIOLOGY | Facility: HOSPITAL | Age: 45
End: 2025-03-20
Payer: COMMERCIAL

## 2025-03-20 NOTE — TELEPHONE ENCOUNTER
3/20/25  1118  Lab requisition mailed to patient.     ----- Message from Nurse Moustapha MCGINNIS sent at 3/19/2025  1:07 PM EDT -----  Get mail his lab rec's if not already done.

## 2025-03-24 LAB
ALBUMIN SERPL-MCNC: 3.8 G/DL (ref 3.6–5.1)
ALP SERPL-CCNC: 102 U/L (ref 36–130)
ALT SERPL-CCNC: 18 U/L (ref 9–46)
ANION GAP SERPL CALCULATED.4IONS-SCNC: 12 MMOL/L (CALC) (ref 7–17)
AST SERPL-CCNC: 14 U/L (ref 10–40)
BILIRUB SERPL-MCNC: 0.6 MG/DL (ref 0.2–1.2)
BUN SERPL-MCNC: 16 MG/DL (ref 7–25)
CALCIUM SERPL-MCNC: 8.8 MG/DL (ref 8.6–10.3)
CHLORIDE SERPL-SCNC: 101 MMOL/L (ref 98–110)
CHOLEST SERPL-MCNC: 156 MG/DL
CHOLEST/HDLC SERPL: 3.7 (CALC)
CO2 SERPL-SCNC: 25 MMOL/L (ref 20–32)
CREAT SERPL-MCNC: 0.6 MG/DL (ref 0.6–1.29)
EGFRCR SERPLBLD CKD-EPI 2021: 121 ML/MIN/1.73M2
ERYTHROCYTE [DISTWIDTH] IN BLOOD BY AUTOMATED COUNT: 14.4 % (ref 11–15)
GLUCOSE SERPL-MCNC: 157 MG/DL (ref 65–99)
HCT VFR BLD AUTO: 39.7 % (ref 38.5–50)
HDLC SERPL-MCNC: 42 MG/DL
HGB BLD-MCNC: 12.9 G/DL (ref 13.2–17.1)
LDLC SERPL CALC-MCNC: 92 MG/DL (CALC)
MAGNESIUM SERPL-MCNC: 1.7 MG/DL (ref 1.5–2.5)
MCH RBC QN AUTO: 26.8 PG (ref 27–33)
MCHC RBC AUTO-ENTMCNC: 32.5 G/DL (ref 32–36)
MCV RBC AUTO: 82.4 FL (ref 80–100)
NONHDLC SERPL-MCNC: 114 MG/DL (CALC)
PLATELET # BLD AUTO: 325 THOUSAND/UL (ref 140–400)
PMV BLD REES-ECKER: 9.9 FL (ref 7.5–12.5)
POTASSIUM SERPL-SCNC: 4.3 MMOL/L (ref 3.5–5.3)
PROT SERPL-MCNC: 6.7 G/DL (ref 6.1–8.1)
RBC # BLD AUTO: 4.82 MILLION/UL (ref 4.2–5.8)
SODIUM SERPL-SCNC: 138 MMOL/L (ref 135–146)
TRIGL SERPL-MCNC: 121 MG/DL
WBC # BLD AUTO: 11.9 THOUSAND/UL (ref 3.8–10.8)

## 2025-03-25 ENCOUNTER — TELEPHONE (OUTPATIENT)
Dept: CARDIOLOGY | Facility: HOSPITAL | Age: 45
End: 2025-03-25
Payer: COMMERCIAL

## 2025-03-25 DIAGNOSIS — E78.00 PURE HYPERCHOLESTEROLEMIA: Primary | ICD-10-CM

## 2025-03-25 DIAGNOSIS — F34.1 PERSISTENT DEPRESSIVE DISORDER: ICD-10-CM

## 2025-03-25 RX ORDER — EZETIMIBE 10 MG/1
10 TABLET ORAL DAILY
Qty: 90 TABLET | Refills: 3 | Status: SHIPPED | OUTPATIENT
Start: 2025-03-25 | End: 2026-03-25

## 2025-03-25 NOTE — TELEPHONE ENCOUNTER
3/25/25  1624  Patient returned call.    Gave lipid panel results to patient and plan for starting zetia 10 mg daily.     Patient verbalized understanding of results and is agreeable to plan.        New Rx for zetia 10 mg daily sent for approval.    3/25/25  1044  Called patient; no answer. Left voice message for patient to return call for results and directives from Dr. Edouard.      ----- Message from Geovanny Edouard sent at 3/25/2025  8:09 AM EDT -----  Please call the patient regarding his abnormal result.  Lipid panel shows suboptimal control of his cholesterol with an LDL cholesterol of 92 and triglycerides of 121.  Please start the patient on Zetia 10 mg daily in addition to his current dose of atorvastatin.

## 2025-03-26 DIAGNOSIS — I10 PRIMARY HYPERTENSION: Primary | ICD-10-CM

## 2025-03-26 DIAGNOSIS — E11.65 TYPE 2 DIABETES MELLITUS WITH HYPERGLYCEMIA, WITHOUT LONG-TERM CURRENT USE OF INSULIN: ICD-10-CM

## 2025-03-26 RX ORDER — ESCITALOPRAM OXALATE 10 MG/1
TABLET ORAL
Qty: 90 TABLET | Refills: 0 | Status: SHIPPED | OUTPATIENT
Start: 2025-03-26

## 2025-03-27 ENCOUNTER — OFFICE VISIT (OUTPATIENT)
Dept: HEMATOLOGY/ONCOLOGY | Facility: CLINIC | Age: 45
End: 2025-03-27
Payer: COMMERCIAL

## 2025-03-27 VITALS
OXYGEN SATURATION: 96 % | BODY MASS INDEX: 54.84 KG/M2 | SYSTOLIC BLOOD PRESSURE: 151 MMHG | TEMPERATURE: 98.6 F | HEART RATE: 95 BPM | DIASTOLIC BLOOD PRESSURE: 87 MMHG | RESPIRATION RATE: 16 BRPM | WEIGHT: 315 LBS

## 2025-03-27 DIAGNOSIS — D64.9 ANEMIA, UNSPECIFIED TYPE: ICD-10-CM

## 2025-03-27 DIAGNOSIS — D72.829 LEUKOCYTOSIS, UNSPECIFIED TYPE: ICD-10-CM

## 2025-03-27 LAB
BASOPHILS # BLD AUTO: 0.04 X10*3/UL (ref 0–0.1)
BASOPHILS NFR BLD AUTO: 0.4 %
EOSINOPHIL # BLD AUTO: 0.15 X10*3/UL (ref 0–0.7)
EOSINOPHIL NFR BLD AUTO: 1.5 %
ERYTHROCYTE [DISTWIDTH] IN BLOOD BY AUTOMATED COUNT: 14.5 % (ref 11.5–14.5)
HCT VFR BLD AUTO: 40.4 % (ref 41–52)
HGB BLD-MCNC: 12.8 G/DL (ref 13.5–17.5)
IMM GRANULOCYTES # BLD AUTO: 0.04 X10*3/UL (ref 0–0.7)
IMM GRANULOCYTES NFR BLD AUTO: 0.4 % (ref 0–0.9)
LYMPHOCYTES # BLD AUTO: 1.54 X10*3/UL (ref 1.2–4.8)
LYMPHOCYTES NFR BLD AUTO: 15.2 %
MCH RBC QN AUTO: 26.2 PG (ref 26–34)
MCHC RBC AUTO-ENTMCNC: 31.7 G/DL (ref 32–36)
MCV RBC AUTO: 83 FL (ref 80–100)
MONOCYTES # BLD AUTO: 0.66 X10*3/UL (ref 0.1–1)
MONOCYTES NFR BLD AUTO: 6.5 %
NEUTROPHILS # BLD AUTO: 7.68 X10*3/UL (ref 1.2–7.7)
NEUTROPHILS NFR BLD AUTO: 76 %
PLATELET # BLD AUTO: 254 X10*3/UL (ref 150–450)
RBC # BLD AUTO: 4.88 X10*6/UL (ref 4.5–5.9)
WBC # BLD AUTO: 10.1 X10*3/UL (ref 4.4–11.3)

## 2025-03-27 PROCEDURE — 1036F TOBACCO NON-USER: CPT | Performed by: INTERNAL MEDICINE

## 2025-03-27 PROCEDURE — 3079F DIAST BP 80-89 MM HG: CPT | Performed by: INTERNAL MEDICINE

## 2025-03-27 PROCEDURE — 85025 COMPLETE CBC W/AUTO DIFF WBC: CPT | Performed by: INTERNAL MEDICINE

## 2025-03-27 PROCEDURE — 99213 OFFICE O/P EST LOW 20 MIN: CPT | Performed by: INTERNAL MEDICINE

## 2025-03-27 PROCEDURE — 36415 COLL VENOUS BLD VENIPUNCTURE: CPT | Performed by: INTERNAL MEDICINE

## 2025-03-27 PROCEDURE — 3077F SYST BP >= 140 MM HG: CPT | Performed by: INTERNAL MEDICINE

## 2025-03-27 ASSESSMENT — PAIN SCALES - GENERAL: PAINLEVEL_OUTOF10: 0-NO PAIN

## 2025-03-27 NOTE — PROGRESS NOTES
Patient Visit Information:   Visit Type: Benign Heme New Visit      History of Present Illness:   Diagnoses, reactive leukocytosis, BCR-ABL negative  ID Statement:    BETINA BERNAL is a 43 year old Male        Chief Complaint: Leukocytosis   Interval History:    3/27/25  Patient evaluated for leukocytosis patient has fluctuating leukocytosis since 2020.  B12 level iron studies within normal limits, BARB negative, sedimentation rate slightly elevated at 32.  BCR-ABL FISH negative.  Today WBC count 10.1 with normal differential count, no history of fever or night sweats patient still working full-time  HPI  Patient is 43-year -old female with a history of   hypertension, hyperlipidemia, obesity, BMI 50, anxiety and depression, vitamin D deficiency, history of sleep apnea,  COPD, anemia and leukocytosis.  Recent CBC did show WBC count 13.5 and patient does not have leukocytosis since 2020.    Patient was evaluated back in December 2021 for leukocytosis and it was thought most probably patient has reactive leukocytosis.     On December 27, 2021 CBC did show WBC count 15.8, hemoglobin 13.1, hematocrit 41.5, platelets 334 with 76% neutrophils, 14.5% lymphocyte, 6.9% monocyte, 1.2% eosinophil and 0.4% basophil.  Absolute neutrophil count was 12.1.       Patient denied any history of fever, night sweats, sore throat, skin rash.  Sometimes patient has arthritis.  Working full-time, not doing regular exercise.  He is overweight and history of sleep apnea.  No chest pain, no shortness of breath, no nausea  vomiting, no abdominal pain, no diarrhea, no dysuria and hematuria.      Patient has a history of mildly leukocytosis.      In December 2020 WBC count 14.0, hemoglobin 13.5, hematocrit 42.7, platelet 340      In January 2021 WBC count 11.5, hemoglobin 12.1, hematocrit 40.8, platelets 300      In March 2021 WBC count 11.3, hemoglobin 13.1, hematocrit 41.1, platelets 295      In August 2021 WBC count 11.9, hemoglobin 14.1,  hematocrit 44.7, platelets 298      December 2021 WBC count was 15.8 as mentioned above.  Differential count has been within normal limits did not show any immature cells    7/22/23 , WBC count 13.6, hemoglobin 12.0, platelets 319 with 76% neutrophil and 14% lymphocyte     Patient is diabetic and renal function LFT within normal limit      Available medical records reviewed     Review of Systems:   Review of Systems:    No headache and dizziness      No fever, no night sweats      No photosensitivity      History of sleep apnea      No chest pain, no shortness of breath      No nausea vomiting      No abdominal pain      No diarrhea and constipation      No dysuria or hematuria      Patient has some arthritis, no skin rash      Not doing regular exercise          Outpatient Medication Profile:  * Patient Currently Takes Medications as of 09-Mar-2022 11:19 documented in Structured Notes        Family History: No Family History items are recorded  in the problem list.      Social History:   Social Substance History:  ·  Smoking Status never smoker    ·  Alcohol Use denies   ·  Drug Use denies             Vitals and Measurements:   Vitals: Temp: 36.3  HR: 94  RR: 18  BP: 143/89  SPO2%:   96   Measurements: HT(cm): 186  WT(kg): 171.5  BSA: 2.97   BMI:  49.5   Last 3 Weights & Heights: Date:                           Weight/Scale Type:                    Height:   09-Mar-2022 11:09                171.5  kg / standing scale                     186  cm         Physical Exam:      Constitutional: awake/alert/oriented x3, no distress,   obese   Eyes: PERRL, EOMI, clear sclera   ENMT: mucous membranes moist, no apparent injury,  very narrow throat   Head/Neck: Neck supple,   thyroid without mass or  tenderness, No JVD, trachea midline, no bruits   Respiratory/Thorax: Patent airways, CTAB, normal  breath sounds   Cardiovascular: Regular, rate and rhythm, no murmurs,    normal S 1and S 2   Gastrointestinal: Nondistended, soft,  non-tender,  no rebound tenderness or guarding, no masses palpable, no organomegaly, +BS,   Genitourinary: No CVA tenderness   Musculoskeletal: ROM intact, no joint swelling, normal  strength   Extremities: normal extremities, no cyanosis edema,  contusions or wounds, no clubbing   Neurological: alert and oriented x3, intact senses,  motor, response and reflexes, normal strength   Lymphatic: No significant lymphadenopathy   Psychological: Appropriate mood and behavior   Skin: Warm and dry, no lesions, no rashes         Lab Results:      3/27/25) 12 mo ago  (3/28/24) 1 yr ago  (2/5/24) 1 yr ago  (11/30/23) 1 yr ago  (7/22/23) 3 yr ago  (3/9/22) 3 yr ago  (12/27/21)    WBC  4.4 - 11.3 x10*3/uL 10.1 10.9 12.3 High  13.0 High  13.6 High  R 14.5 High  R 15.8 High  R   RBC  4.50 - 5.90 x10*6/uL 4.88 4.66 4.39 Low  4.91 4.50 R 4.81 R 4.85 R   Hemoglobin  13.5 - 17.5 g/dL 12.8 Low  12.6 Low  11.5 Low  12.9 Low  12.0 Low  13.1 Low  13.1 Low    Hematocrit  41.0 - 52.0 % 40.4 Low  38.5 Low  37.2 Low  40.6 Low  38.3 Low  39.8 Low  41.5   MCV  80 - 100 fL 83 83 85 83 85 83 86   MCH  26.0 - 34.0 pg 26.2 27.0 26.2 26.3      MCHC  32.0 - 36.0 g/dL 31.7 Low  32.7 30.9 Low  31.8 Low  31.3 Low  32.9 31.6 Low    RDW  11.5 - 14.5 % 14.5 14.7 High  15.1 High  14.3 14.8 High  14.3 14.3   Platelets  150 - 450 x10*3/uL 254             ISCN    FISH NEGATIVE for BCR::ABL1 rearrangement/translocation    nuc ned(ABL1,BCR)x2[250]   Cytogenetics Interpretation    Results Summary  Fluorescence in situ hybridization (FISH) analysis showed NORMAL results with no evidence of BCR::ABL1 rearrangement/9;22 translocation.     Anomaly Result (%) Reference range (%)   BCR::ABL1 0.0% (0.2% or greater)        ·  Results              Assessment and Plan:      Assessment and Plan:   Assessment:    1.  Reactive leukocytosis, since December 2020 which is fluctuating, normal differential count        2.  Anemia which is stable      3.  Hypertension      4.   Diabetes mellitus      5.  Sleep apnea, COPD      6.  Hyper lipidemia        Patient is a 43-year-old gentleman with history of hypertension, diabetes mellitus, obesity, sleep apnea evaluated for leukocytosis.  Patient has leukocytosis since December 2020 WBC 3, run between 11.5-15.8 with normal differential count.  Physical examination  unremarkable no evidence of lymphadenopathy and hepatosplenomegaly, no history of fever or night sweats.      Most probably we are dealing reactive leukocytosis, I will repeat her CBC today, check ESR, BARB, iron study and B12 level.  Because of persistent leukocytosis I will send BCR-ABL studies to rule out underlying myeloproliferative disorder which clinically seem to be less likely.    Pathophysiology of leukocytosis discussed with patient and most probably we are dealing with chronic benign leukocytosis reactive leukocytosis.    3/27/25  #1 reactive leukocytosis, clinically no change in clinically status, today WBC count 10.1.  BCR-ABL FISH negative.  All lab result discussed with the patient.  Hematological point of view no further workup at this time.  Patient is advised to call if he has any questions or concern.  Follow-up after 1 year     Plan:    Above      Time spent 20 minutes.

## 2025-05-17 LAB
ALBUMIN SERPL-MCNC: 3.8 G/DL (ref 3.6–5.1)
ALP SERPL-CCNC: 98 U/L (ref 36–130)
ALT SERPL-CCNC: 18 U/L (ref 9–46)
ANION GAP SERPL CALCULATED.4IONS-SCNC: 9 MMOL/L (CALC) (ref 7–17)
AST SERPL-CCNC: 15 U/L (ref 10–40)
BILIRUB SERPL-MCNC: 0.6 MG/DL (ref 0.2–1.2)
BUN SERPL-MCNC: 15 MG/DL (ref 7–25)
CALCIUM SERPL-MCNC: 9 MG/DL (ref 8.6–10.3)
CHLORIDE SERPL-SCNC: 99 MMOL/L (ref 98–110)
CO2 SERPL-SCNC: 29 MMOL/L (ref 20–32)
CREAT SERPL-MCNC: 0.78 MG/DL (ref 0.6–1.29)
EGFRCR SERPLBLD CKD-EPI 2021: 112 ML/MIN/1.73M2
ERYTHROCYTE [DISTWIDTH] IN BLOOD BY AUTOMATED COUNT: 14.1 % (ref 11–15)
EST. AVERAGE GLUCOSE BLD GHB EST-MCNC: 206 MG/DL
EST. AVERAGE GLUCOSE BLD GHB EST-SCNC: 11.4 MMOL/L
GLUCOSE SERPL-MCNC: 153 MG/DL (ref 65–99)
HBA1C MFR BLD: 8.8 %
HCT VFR BLD AUTO: 40.2 % (ref 38.5–50)
HGB BLD-MCNC: 12.8 G/DL (ref 13.2–17.1)
MCH RBC QN AUTO: 26.8 PG (ref 27–33)
MCHC RBC AUTO-ENTMCNC: 31.8 G/DL (ref 32–36)
MCV RBC AUTO: 84.1 FL (ref 80–100)
PLATELET # BLD AUTO: 292 THOUSAND/UL (ref 140–400)
PMV BLD REES-ECKER: 9.4 FL (ref 7.5–12.5)
POTASSIUM SERPL-SCNC: 4.5 MMOL/L (ref 3.5–5.3)
PROT SERPL-MCNC: 6.8 G/DL (ref 6.1–8.1)
RBC # BLD AUTO: 4.78 MILLION/UL (ref 4.2–5.8)
SODIUM SERPL-SCNC: 137 MMOL/L (ref 135–146)
TSH SERPL-ACNC: 0.93 MIU/L (ref 0.4–4.5)
WBC # BLD AUTO: 11.3 THOUSAND/UL (ref 3.8–10.8)

## 2025-05-21 ENCOUNTER — APPOINTMENT (OUTPATIENT)
Dept: PRIMARY CARE | Facility: CLINIC | Age: 45
End: 2025-05-21
Payer: COMMERCIAL

## 2025-05-21 VITALS
DIASTOLIC BLOOD PRESSURE: 98 MMHG | TEMPERATURE: 97.9 F | BODY MASS INDEX: 41.75 KG/M2 | SYSTOLIC BLOOD PRESSURE: 148 MMHG | WEIGHT: 315 LBS | HEIGHT: 73 IN

## 2025-05-21 DIAGNOSIS — E78.2 MIXED HYPERLIPIDEMIA: ICD-10-CM

## 2025-05-21 DIAGNOSIS — I10 PRIMARY HYPERTENSION: ICD-10-CM

## 2025-05-21 DIAGNOSIS — E66.01 MORBID OBESITY WITH BODY MASS INDEX (BMI) OF 40.0 OR HIGHER (MULTI): Primary | ICD-10-CM

## 2025-05-21 DIAGNOSIS — G47.33 OBSTRUCTIVE SLEEP APNEA ON CPAP: ICD-10-CM

## 2025-05-21 DIAGNOSIS — E11.65 TYPE 2 DIABETES MELLITUS WITH HYPERGLYCEMIA, WITHOUT LONG-TERM CURRENT USE OF INSULIN: ICD-10-CM

## 2025-05-21 DIAGNOSIS — Z12.11 COLON CANCER SCREENING: ICD-10-CM

## 2025-05-21 DIAGNOSIS — I25.10 CORONARY ARTERY DISEASE INVOLVING NATIVE CORONARY ARTERY OF NATIVE HEART, UNSPECIFIED WHETHER ANGINA PRESENT: ICD-10-CM

## 2025-05-21 DIAGNOSIS — F34.1 PERSISTENT DEPRESSIVE DISORDER: ICD-10-CM

## 2025-05-21 PROBLEM — H61.20 IMPACTED CERUMEN: Status: ACTIVE | Noted: 2025-05-21

## 2025-05-21 PROBLEM — R62.50 DEVELOPMENTAL DELAY: Status: ACTIVE | Noted: 2025-05-21

## 2025-05-21 PROBLEM — R01.1 HEART MURMUR: Status: ACTIVE | Noted: 2025-05-21

## 2025-05-21 PROBLEM — M43.6 TORTICOLLIS: Status: ACTIVE | Noted: 2025-05-21

## 2025-05-21 PROBLEM — R79.89 ABNORMAL COMPLETE BLOOD COUNT: Status: ACTIVE | Noted: 2025-05-21

## 2025-05-21 PROBLEM — G47.00 INSOMNIA: Status: ACTIVE | Noted: 2025-05-21

## 2025-05-21 PROCEDURE — 3008F BODY MASS INDEX DOCD: CPT | Performed by: NURSE PRACTITIONER

## 2025-05-21 PROCEDURE — 3077F SYST BP >= 140 MM HG: CPT | Performed by: NURSE PRACTITIONER

## 2025-05-21 PROCEDURE — 99214 OFFICE O/P EST MOD 30 MIN: CPT | Performed by: NURSE PRACTITIONER

## 2025-05-21 PROCEDURE — 3080F DIAST BP >= 90 MM HG: CPT | Performed by: NURSE PRACTITIONER

## 2025-05-21 RX ORDER — METFORMIN HYDROCHLORIDE 500 MG/1
1000 TABLET, EXTENDED RELEASE ORAL
Qty: 360 TABLET | Refills: 3 | Status: SHIPPED | OUTPATIENT
Start: 2025-05-21

## 2025-05-21 RX ORDER — AMLODIPINE BESYLATE 10 MG/1
10 TABLET ORAL DAILY
Qty: 90 TABLET | Refills: 3 | Status: SHIPPED | OUTPATIENT
Start: 2025-05-21

## 2025-05-21 RX ORDER — LISINOPRIL AND HYDROCHLOROTHIAZIDE 20; 25 MG/1; MG/1
1 TABLET ORAL DAILY
Qty: 90 TABLET | Refills: 3 | Status: SHIPPED | OUTPATIENT
Start: 2025-05-21

## 2025-05-21 RX ORDER — ESCITALOPRAM OXALATE 10 MG/1
10 TABLET ORAL DAILY
Qty: 90 TABLET | Refills: 3 | Status: SHIPPED | OUTPATIENT
Start: 2025-05-21

## 2025-05-21 RX ORDER — ATORVASTATIN CALCIUM 80 MG/1
80 TABLET, FILM COATED ORAL NIGHTLY
Qty: 90 TABLET | Refills: 3 | Status: SHIPPED | OUTPATIENT
Start: 2025-05-21 | End: 2026-05-16

## 2025-05-21 ASSESSMENT — PATIENT HEALTH QUESTIONNAIRE - PHQ9
SUM OF ALL RESPONSES TO PHQ9 QUESTIONS 1 AND 2: 0
1. LITTLE INTEREST OR PLEASURE IN DOING THINGS: NOT AT ALL
2. FEELING DOWN, DEPRESSED OR HOPELESS: NOT AT ALL

## 2025-05-21 ASSESSMENT — COLUMBIA-SUICIDE SEVERITY RATING SCALE - C-SSRS
1. IN THE PAST MONTH, HAVE YOU WISHED YOU WERE DEAD OR WISHED YOU COULD GO TO SLEEP AND NOT WAKE UP?: NO
2. HAVE YOU ACTUALLY HAD ANY THOUGHTS OF KILLING YOURSELF?: NO
6. HAVE YOU EVER DONE ANYTHING, STARTED TO DO ANYTHING, OR PREPARED TO DO ANYTHING TO END YOUR LIFE?: NO

## 2025-05-21 NOTE — ASSESSMENT & PLAN NOTE
Orders:    atorvastatin (Lipitor) 80 mg tablet; Take 1 tablet (80 mg) by mouth once daily at bedtime.    Referral to Clinical Pharmacy; Future

## 2025-05-21 NOTE — PROGRESS NOTES
Subjective   Patient ID: Roby Caldwell is a 44 y.o. male who presents for Follow-up (Review labs).  History of Present Illness  Roby Caldwell is a 44 year old male with type 2 diabetes and hypertension who presents for follow-up of his diabetes management.    He has not been actively managing his blood sugar levels and has not been seeing an endocrinologist. He experiences hunger in the morning but does not feel lightheaded or have significant symptoms unless he skips meals. Occasionally, he experiences diaphoresis when he goes too long without eating. He drinks water throughout the day and occasionally has soda with meals. He has not been checking his blood sugars regularly and has not had any recent eye exams since his LASIK surgery years ago.    He has a history of a high white blood cell count, which was previously evaluated by a hematologist, who advised that no further visits were necessary at that time. He also mentions a past wound that was evaluated at urgent care and a wound center, which healed well without complications.    He has sleep apnea and uses a CPAP machine, although he has not been in contact with a sleep specialist recently. He sometimes has difficulty sleeping in his bed and finds comfort sleeping on the floor or in a recliner.    He reports a past injury to his right toe from a piece of firewood falling on it, resulting in the loss of the toenail, which is slowly growing back. He experiences some tingling and pain in his feet after standing for long periods at work, but no significant numbness or neuropathy symptoms.    His current medications include metformin, lisinopril with hydrochlorothiazide, glipizide, Zetia, Lexapro, Lipitor, amlodipine, vitamin D, iron, fish oil, and melatonin as needed. He has not been actively monitoring his protein intake but consumes milk with protein in the mornings and has varied meals throughout the day.  Cardiology - Pace  Hematology - Vel  released  Urgent Care  and then wound center - Now wearing compression regularly  [address] - healthier eating sources  Diet - Better than it was.   If he goes for later lunch- body tells him he needs to eat.  [employer] counts steps, HR BP and PO2  Ususally over 2000 steps per day       Review of Systems   Constitutional:  Positive for activity change and unexpected weight change.   Respiratory:  Positive for apnea.    Cardiovascular:  Positive for leg swelling. Negative for chest pain and palpitations.   Endocrine: Negative for cold intolerance, heat intolerance, polydipsia, polyphagia and polyuria.   Genitourinary:  Positive for frequency.   Musculoskeletal:  Positive for gait problem.   Psychiatric/Behavioral:  The patient is nervous/anxious.        Physical Exam  Constitutional:       Appearance: He is obese.      Comments: Kyphosis on upper back   HENT:      Head: Normocephalic and atraumatic.      Right Ear: Tympanic membrane, ear canal and external ear normal.      Left Ear: Tympanic membrane, ear canal and external ear normal.      Mouth/Throat:      Pharynx: Oropharynx is clear.   Eyes:      Pupils: Pupils are equal, round, and reactive to light.   Cardiovascular:      Rate and Rhythm: Normal rate and regular rhythm.      Pulses: Normal pulses.      Heart sounds: Normal heart sounds.   Pulmonary:      Effort: Pulmonary effort is normal.      Breath sounds: Normal breath sounds.   Abdominal:      General: Bowel sounds are normal.      Palpations: Abdomen is soft.      Tenderness: There is no abdominal tenderness.   Skin:     General: Skin is warm.   Neurological:      Mental Status: He is alert and oriented to person, place, and time.   Psychiatric:         Mood and Affect: Mood normal.         Behavior: Behavior normal.      Comments: Pleasant and cooperative         Results  LABS  Fasting glucose: 126 (02/2024)  Fasting glucose: 153  HbA1c: 8.8  HbA1c: 7.9  TSH: 0.93       Objective     BP (!) 148/98   Temp  "36.6 °C (97.9 °F)   Ht 1.842 m (6' 0.5\")   Wt (!) 184 kg (406 lb)   BMI 54.31 kg/m²      Assessment & Plan  Type 2 Diabetes Mellitus  Type 2 diabetes mellitus with poor glycemic control. Fasting blood sugar is 153 mg/dL and hemoglobin A1c is 8.8%. Emphasized the importance of glycemic control to prevent complications such as ketoacidosis and potential ICU admission. Discussed weight loss to improve glycemic control and the use of GLP-1 medications (Ozempic, Zepbound, Mounjaro) due to BMI over 30, cardiovascular risk, diabetes, and sleep apnea. Explained continuous glucose monitors for easier monitoring and highlighted potential significant weight loss with GLP-1 medications.  - Continue metformin, glipizide, and other current medications.  - Refer to clinical pharmacist Vicky for GLP-1 medication options and continuous glucose monitor.  - Educate on the use of continuous glucose monitor and GLP-1 administration.  - Encourage weight loss and regular blood glucose monitoring.  - Schedule follow-up in 3 months with blood work prior to the visit.    Obesity  Obesity with a BMI of 54. Discussed the impact of weight on glycemic control and overall health. Emphasized weight loss in managing diabetes and reducing cardiovascular risk. Discussed potential significant weight loss with GLP-1 medications.  - Encourage weight loss through dietary changes and increased physical activity.  - Refer to clinical pharmacist for GLP-1 medication options to aid in weight loss.    Sleep Apnea  Sleep apnea managed with CPAP. Current provider no longer manages sleep apnea. Discussed referral to a new sleep medicine group for continued care.  - Refer to sleep medicine group in [place] for ongoing management of sleep apnea.    Hypertension  Hypertension managed with lisinopril and amlodipine. Current management is well-controlled.  - Continue current antihypertensive medications.    Hyperlipidemia  Hyperlipidemia managed with Zetia and " Lipitor. Current management is well-controlled.  - Continue current lipid-lowering medications.    Depression  Depression managed with Lexapro. Current management is well-controlled.  - Continue Lexapro at current dose.    Follow-up  Plan to monitor diabetes management and medication efficacy. Discussed the importance of regular eye exams due to diabetes and potential complications affecting vision.  - Schedule follow-up appointment in 3 months with blood work prior to the visit.  - Ensure prescriptions are filled with refills for convenience.  - Check with mom about ophthalmology referral for annual eye exam.  - Monitor toe healing and consider podiatrist referral if not improving.    Recording duration: 37 minutes    Assessment & Plan  Type 2 diabetes mellitus with hyperglycemia, without long-term current use of insulin    Orders:    metFORMIN  mg 24 hr tablet; Take 2 tablets (1,000 mg) by mouth 2 times daily (morning and late afternoon).    Referral to Clinical Pharmacy; Future    Comprehensive Metabolic Panel; Future    Hemoglobin A1C; Future    Primary hypertension    Orders:    lisinopriL-hydrochlorothiazide 20-25 mg tablet; Take 1 tablet by mouth once daily.    amLODIPine (Norvasc) 10 mg tablet; Take 1 tablet (10 mg) by mouth once daily.    Persistent depressive disorder    Orders:    escitalopram (Lexapro) 10 mg tablet; Take 1 tablet (10 mg) by mouth once daily.    Coronary artery disease involving native coronary artery of native heart, unspecified whether angina present    Orders:    atorvastatin (Lipitor) 80 mg tablet; Take 1 tablet (80 mg) by mouth once daily at bedtime.    Referral to Clinical Pharmacy; Future    Mixed hyperlipidemia    Orders:    atorvastatin (Lipitor) 80 mg tablet; Take 1 tablet (80 mg) by mouth once daily at bedtime.    Lipid Panel; Future    Morbid obesity with body mass index (BMI) of 40.0 or higher (Multi)    Orders:    Referral to Clinical Pharmacy; Future    Obstructive  sleep apnea on CPAP    Orders:    Referral to Adult Sleep Medicine; Future    Referral to Clinical Pharmacy; Future    Colon cancer screening    Orders:    Cologuard® colon cancer screening; Future       Jesus discussion about obesity and chronic illnesses.      ZACH Veloz-CNP     This medical note was created with the assistance of artificial intelligence (AI) for documentation purposes. The content has been reviewed and confirmed by the healthcare provider for accuracy and completeness. Patient consented to the use of audio recording and use of AI during their visit.

## 2025-05-21 NOTE — PATIENT INSTRUCTIONS
VISIT SUMMARY:  Today, we discussed the management of your type 2 diabetes, hypertension, obesity, sleep apnea, hyperlipidemia, and depression. We reviewed your current medications and emphasized the importance of regular monitoring and lifestyle changes to improve your overall health.    YOUR PLAN:  -TYPE 2 DIABETES MELLITUS: Type 2 diabetes is a condition where your body does not use insulin properly, leading to high blood sugar levels. Your fasting blood sugar is 153 mg/dL and hemoglobin A1c is 8.8%, indicating poor control. We discussed the importance of managing your blood sugar to prevent complications. You should continue taking metformin and glipizide, and we will explore GLP-1 medications like Ozempic, Zepbound, or Mounjaro to help with weight loss and blood sugar control. You will be referred to a clinical pharmacist for these options and a continuous glucose monitor. Please monitor your blood sugar regularly and aim for weight loss. Follow up in 3 months with blood work before the visit.    -OBESITY: Obesity is a condition where excess body fat negatively affects your health. Your BMI is 54, which impacts your diabetes and overall health. We discussed the importance of weight loss through diet and exercise. GLP-1 medications may help with significant weight loss. You will be referred to a clinical pharmacist to discuss these options.    -SLEEP APNEA: Sleep apnea is a condition where your breathing stops and starts during sleep. You are using a CPAP machine, but your current provider no longer manages this condition. We will refer you to a new sleep medicine group in [place] for ongoing care.    -HYPERTENSION: Hypertension is high blood pressure. Your condition is well-controlled with lisinopril and amlodipine. Continue taking these medications as prescribed.    -HYPERLIPIDEMIA: Hyperlipidemia is having high levels of fats (lipids) in your blood. Your condition is well-controlled with Zetia and  Lipitor. Continue taking these medications as prescribed.    -DEPRESSION: Depression is a mood disorder characterized by persistent feelings of sadness and loss of interest. Your condition is well-controlled with Lexapro. Continue taking this medication as prescribed.    INSTRUCTIONS:  Please schedule a follow-up appointment in 3 months and complete blood work before the visit. Ensure your prescriptions are filled with refills for convenience. Check with your mom about an ophthalmology referral for your annual eye exam. Monitor the healing of your toe and consider seeing a podiatrist if it does not improve.

## 2025-05-21 NOTE — ASSESSMENT & PLAN NOTE
Orders:    lisinopriL-hydrochlorothiazide 20-25 mg tablet; Take 1 tablet by mouth once daily.    amLODIPine (Norvasc) 10 mg tablet; Take 1 tablet (10 mg) by mouth once daily.

## 2025-05-21 NOTE — ASSESSMENT & PLAN NOTE
Orders:    atorvastatin (Lipitor) 80 mg tablet; Take 1 tablet (80 mg) by mouth once daily at bedtime.    Lipid Panel; Future

## 2025-05-21 NOTE — ASSESSMENT & PLAN NOTE
Orders:    metFORMIN  mg 24 hr tablet; Take 2 tablets (1,000 mg) by mouth 2 times daily (morning and late afternoon).    Referral to Clinical Pharmacy; Future    Comprehensive Metabolic Panel; Future    Hemoglobin A1C; Future

## 2025-05-29 LAB — NONINV COLON CA DNA+OCC BLD SCRN STL QL: NORMAL

## 2025-05-31 PROBLEM — Z12.11 COLON CANCER SCREENING: Status: ACTIVE | Noted: 2025-05-31

## 2025-05-31 PROBLEM — F34.1 PERSISTENT DEPRESSIVE DISORDER: Status: ACTIVE | Noted: 2025-05-31

## 2025-05-31 ASSESSMENT — ENCOUNTER SYMPTOMS
POLYDIPSIA: 0
ACTIVITY CHANGE: 1
POLYPHAGIA: 0
PALPITATIONS: 0
UNEXPECTED WEIGHT CHANGE: 1
NERVOUS/ANXIOUS: 1
APNEA: 1
FREQUENCY: 1

## 2025-06-05 ENCOUNTER — APPOINTMENT (OUTPATIENT)
Dept: PHARMACY | Facility: HOSPITAL | Age: 45
End: 2025-06-05
Payer: COMMERCIAL

## 2025-06-05 DIAGNOSIS — I25.10 CORONARY ARTERY DISEASE INVOLVING NATIVE CORONARY ARTERY OF NATIVE HEART, UNSPECIFIED WHETHER ANGINA PRESENT: ICD-10-CM

## 2025-06-05 DIAGNOSIS — E66.01 MORBID OBESITY WITH BODY MASS INDEX (BMI) OF 40.0 OR HIGHER (MULTI): ICD-10-CM

## 2025-06-05 DIAGNOSIS — E11.65 TYPE 2 DIABETES MELLITUS WITH HYPERGLYCEMIA, WITHOUT LONG-TERM CURRENT USE OF INSULIN: ICD-10-CM

## 2025-06-05 DIAGNOSIS — G47.33 OBSTRUCTIVE SLEEP APNEA ON CPAP: ICD-10-CM

## 2025-06-05 PROCEDURE — RXMED WILLOW AMBULATORY MEDICATION CHARGE

## 2025-06-05 RX ORDER — LANCETS
EACH MISCELLANEOUS
Qty: 100 EACH | Refills: 3 | Status: SHIPPED | OUTPATIENT
Start: 2025-06-05

## 2025-06-05 RX ORDER — DEXTROSE 4 G
TABLET,CHEWABLE ORAL
Qty: 1 EACH | Refills: 0 | Status: SHIPPED | OUTPATIENT
Start: 2025-06-05

## 2025-06-05 RX ORDER — TIRZEPATIDE 2.5 MG/.5ML
2.5 INJECTION, SOLUTION SUBCUTANEOUS WEEKLY
Qty: 2 ML | Refills: 1 | Status: SHIPPED | OUTPATIENT
Start: 2025-06-05

## 2025-06-05 RX ORDER — IBUPROFEN 200 MG
CAPSULE ORAL
Qty: 100 EACH | Refills: 3 | Status: SHIPPED | OUTPATIENT
Start: 2025-06-05

## 2025-06-05 NOTE — PROGRESS NOTES
Clinical Pharmacy Appointment    Patient ID: Roby Caldwell is a 44 y.o. male who presents for Diabetes.    Pt is here for First appointment.     Referring Provider: Veronica Sumner APRN-C*  PCP: KIMMY Veloz   Last visit with PCP: 5/21/2025   Next visit with PCP: 9/4/2025    Subjective     HPI  DIABETES MELLITUS TYPE 2:    Diagnosed with diabetes: 2019  Known diabetic complications: none known  Does patient follow with Endocrinology: no  Last optometry exam: needs to schedule     Current diabetic medications include:  glipizide 10 mg daily  metformin 1000 mg twice daily    No side effects at this time. Patient occasionally forgets morning doses on days off.    Historical diabetic medications include:  No others for diabetes    Glucose Readings:  Patient does not have glucometer/testing supplies - will order  He denies symptoms of hypoglycemia at this time    Lifestyle:  Diet: 3 meals/day, room for improvement  Breakfast: milk and snack  Lunch: orders via [employer] or picks up fast food, sometimes salad  Dinner: protein, veggie, potato  Snacks: cereal, tortilla chips  Drinks: water, 1-2 sodas with dinner  Physical Activity: active at work  Alcohol Use: None     Secondary Prevention:  Statin? Yes  ACE-I/ARB? Yes  Aspirin? No    Pertinent PMH Review:  PMH of Pancreatitis: No  PMH of Gallbladder Disease: No  PMH of Retinopathy: No  PMH of Urinary Tract Infections: No  PMH of MTC: No      Medication System Management  Patient's preferred pharmacy: [facility] [place] [facility]  Adherence/Organization: occasional missed doses of morning meds on days off  Affordability/Accessibility: no concerns at this time  Mounjaro prior authorization approved through June 4, 2028      Objective   Allergies[1]  Social History     Social History Narrative    Not on file      Medication Review  Current Outpatient Medications   Medication Instructions    amLODIPine (NORVASC) 10 mg, oral, Daily    atorvastatin  "(LIPITOR) 80 mg, oral, Nightly    blood sugar diagnostic (Blood Glucose Test) Use as directed to check fasting blood glucose once daily    blood-glucose meter misc Use as directed to check fasting blood glucose once daily    cholecalciferol (Vitamin D-3) 50 mcg (2,000 unit) capsule 1 capsule, Daily    escitalopram (LEXAPRO) 10 mg, oral, Daily    ezetimibe (ZETIA) 10 mg, oral, Daily    ferrous sulfate 325 (65 Fe) MG tablet 1 tablet, Daily    fish oil concentrate (Omega-3) 120-180 mg capsule 1,000 mg    glipiZIDE XL (Glucotrol XL) 10 mg 24 hr tablet TAKE ONE TABLET BY MOUTH DAILY with breakfast and dinner    lancets misc Use as directed to check fasting blood glucose once daily    lisinopriL-hydrochlorothiazide 20-25 mg tablet 1 tablet, oral, Daily    melatonin 3 mg    metFORMIN XR (GLUCOPHAGE-XR) 1,000 mg, oral, 2 times daily (morning and late afternoon)    Mounjaro 2.5 mg, subcutaneous, Weekly      Vitals  BP Readings from Last 2 Encounters:   05/21/25 (!) 148/98   03/27/25 151/87     BMI Readings from Last 1 Encounters:   05/21/25 54.31 kg/m²      Labs  A1C  Lab Results   Component Value Date    HGBA1C 8.8 (H) 05/16/2025    HGBA1C 7.1 (H) 02/05/2024    HGBA1C 6.5 (A) 07/22/2023     BMP  Lab Results   Component Value Date    CALCIUM 9.0 05/16/2025     05/16/2025    K 4.5 05/16/2025    CO2 29 05/16/2025    CL 99 05/16/2025    BUN 15 05/16/2025    CREATININE 0.78 05/16/2025    EGFR 112 05/16/2025     LFTs  Lab Results   Component Value Date    ALT 18 05/16/2025    AST 15 05/16/2025    ALKPHOS 98 05/16/2025    BILITOT 0.6 05/16/2025     FLP  Lab Results   Component Value Date    TRIG 121 03/24/2025    CHOL 156 03/24/2025    LDLF 62 07/22/2023    LDLCALC 92 03/24/2025    HDL 42 03/24/2025     Urine Microalbumin  No results found for: \"MICROALBCREA\"  Weight Management  Wt Readings from Last 3 Encounters:   05/21/25 (!) 184 kg (406 lb)   03/27/25 (!) 183 kg (404 lb 5.2 oz)   03/28/24 (!) 178 kg (391 lb 15.7 oz)    "   There is no height or weight on file to calculate BMI.     Assessment/Plan   Problem List Items Addressed This Visit       CAD (coronary artery disease)    Relevant Medications    tirzepatide (Mounjaro) 2.5 mg/0.5 mL pen injector    Other Relevant Orders    Referral to Clinical Pharmacy    Morbid obesity with body mass index (BMI) of 40.0 or higher (Multi)    Relevant Medications    tirzepatide (Mounjaro) 2.5 mg/0.5 mL pen injector    Other Relevant Orders    Referral to Clinical Pharmacy    Obstructive sleep apnea on CPAP    Relevant Medications    tirzepatide (Mounjaro) 2.5 mg/0.5 mL pen injector    Other Relevant Orders    Referral to Clinical Pharmacy    Type 2 diabetes mellitus with hyperglycemia, without long-term current use of insulin    Relevant Medications    tirzepatide (Mounjaro) 2.5 mg/0.5 mL pen injector    lancets misc    blood sugar diagnostic (Blood Glucose Test)    blood-glucose meter misc    Other Relevant Orders    Referral to Clinical Pharmacy       DISCUSSION/PLAN:  Patient with diabetes that needs improvement, most recent A1c 8.8% in May 2025 (goal < 7%). He occasionally misses doses of morning medications but denies side effects or symptoms of hypoglycemia at this time. We discussed diet changes and continuous blood glucose monitors as this may help him see how different foods and exercise affect glucose levels. Will attempt prior authorization for Dexcom since this is preferred by his plan - likely to be denied as he is not on insulin. We reviewed OTC options and copay card prices for Freestyle Emily system (~$75/month), but he declined. Will order glucometer/testing supplies for now.    Recommend adding GLP1 agonist to regimen for improved blood glucose control, weight loss, BASILIO improvement, and cardiac/renal benefits. Prefer Mounjaro for greater weight loss potential with dual mechanism and recent clinical evidence to support improved BASILIO outcomes. Prior authorization for Mounjaro was  approved and copay is currently affordable. Detailed education provided at this visit. Will start Mounjaro 2.5 mg and follow up in one month or sooner if needed.    Of note, patient has a [facility] [employer] plan. There have been recent changes and it is possible that Mounjaro will be removed from the formulary on July 1st - will reassess and attempt formulary exception at next visit if needed.    CONTINUE  glipizide 10 mg daily  metformin 1000 mg twice daily  START  Mounjaro 2.5 mg under the skin once weekly    Future Considerations:  Mounjaro dose titration  glipizide dose decrease/discontinuation if able    Monitoring and Education:  Counseled on mechanism of action, benefits, dosing, drug interactions, side effects, and monitoring of diabetes medications  Detailed education on Mounjaro administration to ensure correct technique  Discussed storage, disposal, missed doses, and titration schedule  Answered all patient questions    Continue all meds under the continuation of care with the referring provider and clinical pharmacy team.    Clinical Pharmacist follow-up: July 9th, 2025 at 4:00 PM  Orders placed: Mounjaro 2.5 mg, glucometer/testing supplies to [facility] for home delivery    Thank you,   Vicky Rea, PharmD  Clinical Pharmacy Specialist, Primary Care   [phone]    Verbal consent to manage patient's drug therapy was obtained from the patient . Patient was informed he  may decline to participate or withdraw from participation in pharmacy services at any time.         [1] No Known Allergies

## 2025-06-06 ENCOUNTER — PHARMACY VISIT (OUTPATIENT)
Dept: PHARMACY | Facility: CLINIC | Age: 45
End: 2025-06-06
Payer: MEDICARE

## 2025-06-09 ENCOUNTER — PHARMACY VISIT (OUTPATIENT)
Dept: PHARMACY | Facility: CLINIC | Age: 45
End: 2025-06-09

## 2025-06-10 LAB — NONINV COLON CA DNA+OCC BLD SCRN STL QL: NEGATIVE

## 2025-06-27 PROCEDURE — RXMED WILLOW AMBULATORY MEDICATION CHARGE

## 2025-07-01 ENCOUNTER — PHARMACY VISIT (OUTPATIENT)
Dept: PHARMACY | Facility: CLINIC | Age: 45
End: 2025-07-01
Payer: MEDICARE

## 2025-07-09 ENCOUNTER — APPOINTMENT (OUTPATIENT)
Dept: PHARMACY | Facility: HOSPITAL | Age: 45
End: 2025-07-09
Payer: COMMERCIAL

## 2025-07-09 DIAGNOSIS — E66.01 MORBID OBESITY WITH BODY MASS INDEX (BMI) OF 40.0 OR HIGHER (MULTI): ICD-10-CM

## 2025-07-09 DIAGNOSIS — G47.33 OBSTRUCTIVE SLEEP APNEA ON CPAP: ICD-10-CM

## 2025-07-09 DIAGNOSIS — E11.65 TYPE 2 DIABETES MELLITUS WITH HYPERGLYCEMIA, WITHOUT LONG-TERM CURRENT USE OF INSULIN: ICD-10-CM

## 2025-07-09 DIAGNOSIS — I25.10 CORONARY ARTERY DISEASE INVOLVING NATIVE CORONARY ARTERY OF NATIVE HEART, UNSPECIFIED WHETHER ANGINA PRESENT: ICD-10-CM

## 2025-07-09 NOTE — PROGRESS NOTES
Clinical Pharmacy Appointment    Patient ID: Roby Caldwell is a 44 y.o. male who presents for Diabetes.    Pt is here for Follow Up appointment.     Referring Provider: Veronica Sumner APRN-C*  PCP: KIMMY Veloz   Last visit with PCP: 5/21/2025   Next visit with PCP: 9/4/2025    INTERVAL HISTORY   Patient's last appointment with clinical pharmacy team on 6/5/2025  Recent hospitalizations? No   Medication changes? No   Missed doses of medications? No   Side effects? No   Updated relevant labs? No   Changes to diet or exercise regimen? Yes   Patient has noticeable appetite suppression and is eating smaller portions with less snacking      Subjective     HPI  DIABETES MELLITUS TYPE 2:    Diagnosed with diabetes: 2019  Known diabetic complications: none known  Does patient follow with Endocrinology: no  Last optometry exam: needs to schedule     Current diabetic medications include:  glipizide 10 mg daily  metformin 1000 mg twice daily  Mounjaro 2.5 mg weekly    Historical diabetic medications include:  No others for diabetes    Glucose Readings:  Patient has glucometer/testing supplies - no recent readings available  He denies symptoms of hypoglycemia at this time    Lifestyle:  Diet: 3 meals/day, room for improvement  Breakfast: milk and snack  Lunch: orders via [employer] or picks up fast food, sometimes salad  Dinner: protein, veggie, potato  Snacks: cereal, tortilla chips  Drinks: water, 1-2 sodas with dinner  Physical Activity: active at work  Alcohol Use: None     Weight Loss:  Starting weight 406 lbs at office visit 5/21/2025 (prior to GLP1)  Current weight under 400 lbs on home scale 7/9/2025 (on Mounjaro 2.5 mg)    Secondary Prevention:  Statin? Yes  ACE-I/ARB? Yes  Aspirin? No    Pertinent PMH Review:  PMH of Pancreatitis: No  PMH of Gallbladder Disease: No  PMH of Retinopathy: No  PMH of Urinary Tract Infections: No  PMH of MTC: No      Medication System Management  Patient's preferred  pharmacy: [facility] in [place] or  [place] delivery  Adherence/Organization: occasional missed doses of morning meds on days off  Affordability/Accessibility: no concerns at this time  Mounjaro prior authorization approved through June 4, 2028      Objective   Allergies[1]  Social History     Social History Narrative    Not on file      Medication Review  Current Outpatient Medications   Medication Instructions    amLODIPine (NORVASC) 10 mg, oral, Daily    atorvastatin (LIPITOR) 80 mg, oral, Nightly    blood sugar diagnostic (Blood Glucose Test) Use as directed to check fasting blood glucose once daily    blood-glucose meter misc Use as directed to check fasting blood glucose once daily    cholecalciferol (Vitamin D-3) 50 mcg (2,000 unit) capsule 1 capsule, Daily    escitalopram (LEXAPRO) 10 mg, oral, Daily    ezetimibe (ZETIA) 10 mg, oral, Daily    ferrous sulfate 325 (65 Fe) MG tablet 1 tablet, Daily    fish oil concentrate (Omega-3) 120-180 mg capsule 1,000 mg    glipiZIDE XL (Glucotrol XL) 10 mg 24 hr tablet TAKE ONE TABLET BY MOUTH DAILY with breakfast and dinner    lancets misc Use as directed to check fasting blood glucose once daily    lisinopriL-hydrochlorothiazide 20-25 mg tablet 1 tablet, oral, Daily    melatonin 3 mg    metFORMIN XR (GLUCOPHAGE-XR) 1,000 mg, oral, 2 times daily (morning and late afternoon)    Mounjaro 2.5 mg, subcutaneous, Weekly      Vitals  BP Readings from Last 2 Encounters:   05/21/25 (!) 148/98   03/27/25 151/87     BMI Readings from Last 1 Encounters:   05/21/25 54.31 kg/m²      Labs  A1C  Lab Results   Component Value Date    HGBA1C 8.8 (H) 05/16/2025    HGBA1C 7.1 (H) 02/05/2024    HGBA1C 6.5 (A) 07/22/2023     BMP  Lab Results   Component Value Date    CALCIUM 9.0 05/16/2025     05/16/2025    K 4.5 05/16/2025    CO2 29 05/16/2025    CL 99 05/16/2025    BUN 15 05/16/2025    CREATININE 0.78 05/16/2025    EGFR 112 05/16/2025     LFTs  Lab Results   Component Value Date     "ALT 18 05/16/2025    AST 15 05/16/2025    ALKPHOS 98 05/16/2025    BILITOT 0.6 05/16/2025     FLP  Lab Results   Component Value Date    TRIG 121 03/24/2025    CHOL 156 03/24/2025    LDLF 62 07/22/2023    LDLCALC 92 03/24/2025    HDL 42 03/24/2025     Urine Microalbumin  No results found for: \"MICROALBCREA\"  Weight Management  Wt Readings from Last 3 Encounters:   05/21/25 (!) 184 kg (406 lb)   03/27/25 (!) 183 kg (404 lb 5.2 oz)   03/28/24 (!) 178 kg (391 lb 15.7 oz)      There is no height or weight on file to calculate BMI.     Assessment/Plan   Problem List Items Addressed This Visit       CAD (coronary artery disease)    Relevant Orders    Referral to Clinical Pharmacy    Morbid obesity with body mass index (BMI) of 40.0 or higher (Multi)    Relevant Orders    Referral to Clinical Pharmacy    Obstructive sleep apnea on CPAP    Relevant Orders    Referral to Clinical Pharmacy    Type 2 diabetes mellitus with hyperglycemia, without long-term current use of insulin    Relevant Orders    Referral to Clinical Pharmacy       DISCUSSION/PLAN:  Patient with diabetes that needs improvement, most recent A1c 8.8% in May 2025 (goal < 7%). He recently started Mounjaro for improved blood glucose control, weight loss, BASILIO improvement, and cardiac/renal benefits. He is not self-monitoring blood glucose. Patient denies significant side effects, missed doses, or symptoms of hypoglycemia at this time. He has noticeable appetite suppression and is eating smaller portion sizes with less snacking. Will continue current Mounjaro dose for now as he has just refilled the prescription - likely increase at next visit. Follow up in 4 weeks or sooner if needed.    CONTINUE  glipizide 10 mg daily  metformin 1000 mg twice daily  Mounjaro 2.5 mg under the skin once weekly on Fridays    Future Considerations:  Mounjaro dose titration  glipizide dose decrease/discontinuation if able    Monitoring and Education:  Counseled on mechanism of " action, dosing, drug interactions, side effects, and monitoring of diabetes medications  Reviewed Mounjaro administration and dose titration schedule  Reviewed symptoms and treatment of hypoglcyemia  Answered all patient questions    Continue all meds under the continuation of care with the referring provider and clinical pharmacy team.    Clinical Pharmacist follow-up: August 6th, 2025 at 4:00 PM  Orders placed: none at this time    Thank you,   Vicky Rea, PharmD  Clinical Pharmacy Specialist, Primary Care   [phone]    Verbal consent to manage patient's drug therapy was obtained from the patient . Patient was informed he  may decline to participate or withdraw from participation in pharmacy services at any time.         [1] No Known Allergies

## 2025-07-30 DIAGNOSIS — E11.65 TYPE 2 DIABETES MELLITUS WITH HYPERGLYCEMIA, WITHOUT LONG-TERM CURRENT USE OF INSULIN: ICD-10-CM

## 2025-07-31 RX ORDER — GLIPIZIDE 10 MG/1
TABLET, FILM COATED, EXTENDED RELEASE ORAL
Qty: 180 TABLET | Refills: 3 | Status: SHIPPED | OUTPATIENT
Start: 2025-07-31

## 2025-08-04 DIAGNOSIS — E11.65 TYPE 2 DIABETES MELLITUS WITH HYPERGLYCEMIA, WITHOUT LONG-TERM CURRENT USE OF INSULIN: Primary | ICD-10-CM

## 2025-08-04 DIAGNOSIS — G47.33 OBSTRUCTIVE SLEEP APNEA ON CPAP: ICD-10-CM

## 2025-08-04 DIAGNOSIS — E66.01 MORBID OBESITY WITH BODY MASS INDEX (BMI) OF 40.0 OR HIGHER (MULTI): ICD-10-CM

## 2025-08-04 DIAGNOSIS — I25.10 CORONARY ARTERY DISEASE INVOLVING NATIVE CORONARY ARTERY OF NATIVE HEART, UNSPECIFIED WHETHER ANGINA PRESENT: ICD-10-CM

## 2025-08-04 PROCEDURE — RXMED WILLOW AMBULATORY MEDICATION CHARGE

## 2025-08-04 RX ORDER — TIRZEPATIDE 5 MG/.5ML
5 INJECTION, SOLUTION SUBCUTANEOUS WEEKLY
Qty: 2 ML | Refills: 0 | Status: SHIPPED | OUTPATIENT
Start: 2025-08-04

## 2025-08-06 ENCOUNTER — APPOINTMENT (OUTPATIENT)
Dept: PHARMACY | Facility: HOSPITAL | Age: 45
End: 2025-08-06
Payer: COMMERCIAL

## 2025-08-06 DIAGNOSIS — G47.33 OBSTRUCTIVE SLEEP APNEA ON CPAP: ICD-10-CM

## 2025-08-06 DIAGNOSIS — E66.01 MORBID OBESITY WITH BODY MASS INDEX (BMI) OF 40.0 OR HIGHER (MULTI): ICD-10-CM

## 2025-08-06 DIAGNOSIS — I25.10 CORONARY ARTERY DISEASE INVOLVING NATIVE CORONARY ARTERY OF NATIVE HEART, UNSPECIFIED WHETHER ANGINA PRESENT: ICD-10-CM

## 2025-08-06 DIAGNOSIS — E11.65 TYPE 2 DIABETES MELLITUS WITH HYPERGLYCEMIA, WITHOUT LONG-TERM CURRENT USE OF INSULIN: Primary | ICD-10-CM

## 2025-08-06 NOTE — PROGRESS NOTES
Clinical Pharmacy Appointment    Patient ID: Roby Caldwell is a 44 y.o. male who presents for Diabetes.    Pt is here for Follow Up appointment.     Referring Provider: Veronica Sumner APRN-C*  PCP: KIMMY Veloz   Last visit with PCP: 5/21/2025   Next visit with PCP: 9/4/2025    INTERVAL HISTORY   Patient's last appointment with clinical pharmacy team on 7/9/2025  Recent hospitalizations? No   Medication changes? No   Missed doses of medications? Yes  One recent missed dose of evening metformin  Side effects? No   Updated relevant labs? No   Changes to diet or exercise regimen? No  He continues with smaller portion sizes and less snacking      Subjective     HPI  DIABETES MELLITUS TYPE 2:    Diagnosed with diabetes: 2019  Known diabetic complications: none known  Does patient follow with Endocrinology: no  Last optometry exam: needs to schedule     Current diabetic medications include:  glipizide 10 mg daily  metformin 1000 mg twice daily  Mounjaro 2.5 mg weekly    Historical diabetic medications include:  No others for diabetes    Glucose Readings:  Patient has glucometer/testing supplies - no recent readings available  He denies symptoms of hypoglycemia at this time    Lifestyle:  Diet: 3 meals/day, room for improvement  Breakfast: milk and snack  Lunch: orders via [employer] or picks up fast food, sometimes salad  Dinner: protein, veggie, potato  Snacks: cereal, tortilla chips  Drinks: water, 1-2 sodas with dinner  Physical Activity: active at work  Alcohol Use: None     Weight Loss:  Starting weight 406 lbs at office visit 5/21/2025 (prior to GLP1)  Previous weight under 400 lbs on home scale 7/9/2025 (on Mounjaro 2.5 mg)  Current weight 380 lbs on home scale 8/6/2025 (on Mounjaro 2.5 mg)    Secondary Prevention:  Statin? Yes  ACE-I/ARB? Yes  Aspirin? No    Pertinent PMH Review:  PMH of Pancreatitis: No  PMH of Gallbladder Disease: No  PMH of Retinopathy: No  PMH of Urinary Tract Infections:  No  PMH of MTC: No      Medication System Management  Patient's preferred pharmacy: [facility] in [place] or  [place] delivery  Adherence/Organization: occasional missed doses of morning meds on days off  Affordability/Accessibility: no concerns at this time  Mounjaro prior authorization approved through June 4, 2028      Objective   Allergies[1]  Social History     Social History Narrative    Not on file      Medication Review  Current Outpatient Medications   Medication Instructions    amLODIPine (NORVASC) 10 mg, oral, Daily    atorvastatin (LIPITOR) 80 mg, oral, Nightly    blood sugar diagnostic (Blood Glucose Test) Use as directed to check fasting blood glucose once daily    blood-glucose meter misc Use as directed to check fasting blood glucose once daily    cholecalciferol (Vitamin D-3) 50 mcg (2,000 unit) capsule 1 capsule, Daily    escitalopram (LEXAPRO) 10 mg, oral, Daily    ezetimibe (ZETIA) 10 mg, oral, Daily    ferrous sulfate 325 (65 Fe) MG tablet 1 tablet, Daily    fish oil concentrate (Omega-3) 120-180 mg capsule 1,000 mg    glipiZIDE XL (Glucotrol XL) 10 mg 24 hr tablet TAKE ONE TABLET BY MOUTH DAILY with breakfast and dinner    lancets misc Use as directed to check fasting blood glucose once daily    lisinopriL-hydrochlorothiazide 20-25 mg tablet 1 tablet, oral, Daily    melatonin 3 mg    metFORMIN XR (GLUCOPHAGE-XR) 1,000 mg, oral, 2 times daily (morning and late afternoon)    Mounjaro 5 mg, subcutaneous, Weekly      Vitals  BP Readings from Last 2 Encounters:   05/21/25 (!) 148/98   03/27/25 151/87     BMI Readings from Last 1 Encounters:   05/21/25 54.31 kg/m²      Labs  A1C  Lab Results   Component Value Date    HGBA1C 8.8 (H) 05/16/2025    HGBA1C 7.1 (H) 02/05/2024    HGBA1C 6.5 (A) 07/22/2023     BMP  Lab Results   Component Value Date    CALCIUM 9.0 05/16/2025     05/16/2025    K 4.5 05/16/2025    CO2 29 05/16/2025    CL 99 05/16/2025    BUN 15 05/16/2025    CREATININE 0.78 05/16/2025  "   EGFR 112 05/16/2025     LFTs  Lab Results   Component Value Date    ALT 18 05/16/2025    AST 15 05/16/2025    ALKPHOS 98 05/16/2025    BILITOT 0.6 05/16/2025     FLP  Lab Results   Component Value Date    TRIG 121 03/24/2025    CHOL 156 03/24/2025    LDLF 62 07/22/2023    LDLCALC 92 03/24/2025    HDL 42 03/24/2025     Urine Microalbumin  No results found for: \"MICROALBCREA\"  Weight Management  Wt Readings from Last 3 Encounters:   05/21/25 (!) 184 kg (406 lb)   03/27/25 (!) 183 kg (404 lb 5.2 oz)   03/28/24 (!) 178 kg (391 lb 15.7 oz)      There is no height or weight on file to calculate BMI.     Assessment/Plan   Problem List Items Addressed This Visit       CAD (coronary artery disease)    Relevant Orders    Referral to Clinical Pharmacy    Morbid obesity with body mass index (BMI) of 40.0 or higher (Multi)    Relevant Orders    Referral to Clinical Pharmacy    Obstructive sleep apnea on CPAP    Relevant Orders    Referral to Clinical Pharmacy    Type 2 diabetes mellitus with hyperglycemia, without long-term current use of insulin - Primary    Relevant Orders    Referral to Clinical Pharmacy       DISCUSSION/PLAN:  Patient with diabetes that needs improvement, most recent A1c 8.8% in May 2025 (goal < 7%). He started Mounjaro in June 2025 for improved blood glucose control, weight loss, BASILIO improvement, and cardiac/renal benefits. He is not self-monitoring blood glucose. Patient denies significant side effects or symptoms of hypoglycemia at this time. He missed one recent dose of metformin. Weight is down about 20 pounds so far! Will increase Mounjaro dose to 5 mg as he is tolerating well. Anticipate updated A1c at next PCP appointment and will reassess regimen in one month.    INCREASE  Mounjaro to 5 mg under the skin once weekly on Fridays  CONTINUE  glipizide 10 mg daily  metformin 1000 mg twice daily    Future Considerations:  Mounjaro dose titration  glipizide dose decrease/discontinuation if " able    Monitoring and Education:  Counseled on mechanism of action, dosing, drug interactions, side effects, and monitoring of diabetes medications  Reviewed Mounjaro administration and dose titration schedule  Reviewed symptoms and treatment of hypoglcyemia  Answered all patient questions    Continue all meds under the continuation of care with the referring provider and clinical pharmacy team.    Clinical Pharmacist follow-up: September 11th, 2025 at 4:00 PM  Orders placed: none at this time    Thank you,   Vicky Rea, PharmD  Clinical Pharmacy Specialist, Primary Care   [phone]    Verbal consent to manage patient's drug therapy was obtained from the patient . Patient was informed he  may decline to participate or withdraw from participation in pharmacy services at any time.         [1] No Known Allergies

## 2025-08-07 ENCOUNTER — TELEMEDICINE (OUTPATIENT)
Dept: SLEEP MEDICINE | Facility: CLINIC | Age: 45
End: 2025-08-07
Payer: COMMERCIAL

## 2025-08-07 ENCOUNTER — PHARMACY VISIT (OUTPATIENT)
Dept: PHARMACY | Facility: CLINIC | Age: 45
End: 2025-08-07
Payer: MEDICARE

## 2025-08-07 VITALS — BODY MASS INDEX: 40.43 KG/M2 | HEIGHT: 74 IN | WEIGHT: 315 LBS

## 2025-08-07 DIAGNOSIS — G47.33 OSA TREATED WITH BIPAP: Primary | ICD-10-CM

## 2025-08-07 DIAGNOSIS — E66.01 MORBID OBESITY (MULTI): ICD-10-CM

## 2025-08-07 PROCEDURE — 99204 OFFICE O/P NEW MOD 45 MIN: CPT | Performed by: INTERNAL MEDICINE

## 2025-08-07 PROCEDURE — 1036F TOBACCO NON-USER: CPT | Performed by: INTERNAL MEDICINE

## 2025-08-07 PROCEDURE — 3008F BODY MASS INDEX DOCD: CPT | Performed by: INTERNAL MEDICINE

## 2025-08-07 ASSESSMENT — SLEEP AND FATIGUE QUESTIONNAIRES
ESS TOTAL SCORE: 5
SITING INACTIVE IN A PUBLIC PLACE LIKE A CLASS ROOM OR A MOVIE THEATER: WOULD NEVER DOZE
HOW LIKELY ARE YOU TO NOD OFF OR FALL ASLEEP IN A CAR, WHILE STOPPED FOR A FEW MINUTES IN TRAFFIC: WOULD NEVER DOZE
HOW LIKELY ARE YOU TO NOD OFF OR FALL ASLEEP WHILE SITTING QUIETLY AFTER LUNCH WITHOUT ALCOHOL: WOULD NEVER DOZE
HOW LIKELY ARE YOU TO NOD OFF OR FALL ASLEEP WHEN YOU ARE A PASSENGER IN A CAR FOR AN HOUR WITHOUT A BREAK: SLIGHT CHANCE OF DOZING
DIFFICULTY_FALLING_ASLEEP: MODERATE
WORRIED_DISTRESSED_DUE_TO_SLEEP: SOMEWHAT
HOW LIKELY ARE YOU TO NOD OFF OR FALL ASLEEP WHILE SITTING AND TALKING TO SOMEONE: WOULD NEVER DOZE
HOW LIKELY ARE YOU TO NOD OFF OR FALL ASLEEP WHILE SITTING INACTIVE IN A PUBLIC PLACE: WOULD NEVER DOZE
ESS-CHAD TOTAL SCORE: 5
HOW LIKELY ARE YOU TO NOD OFF OR FALL ASLEEP IN A CAR, WHILE STOPPED FOR A FEW MINUTES IN TRAFFIC: WOULD NEVER DOZE
SLEEP_PROBLEM_NOTICEABLE_TO_OTHERS: A LITTLE
HOW LIKELY ARE YOU TO NOD OFF OR FALL ASLEEP WHILE LYING DOWN TO REST IN THE AFTERNOON WHEN CIRCUMSTANCES PERMIT: MODERATE CHANCE OF DOZING
HOW LIKELY ARE YOU TO NOD OFF OR FALL ASLEEP WHILE SITTING AND READING: WOULD NEVER DOZE
DIFFICULTY_STAYING_ASLEEP: MODERATE
HOW LIKELY ARE YOU TO NOD OFF OR FALL ASLEEP WHILE WATCHING TV: MODERATE CHANCE OF DOZING
SATISFACTION_WITH_CURRENT_SLEEP_PATTERN: SATISFIED
SLEEP_PROBLEM_INTERFERES_DAILY_ACTIVITIES: A LITTLE
WAKING_TOO_EARLY: MILD

## 2025-08-07 ASSESSMENT — ENCOUNTER SYMPTOMS
LOSS OF SENSATION IN FEET: 0
DEPRESSION: 0
OCCASIONAL FEELINGS OF UNSTEADINESS: 0

## 2025-08-07 NOTE — PROGRESS NOTES
[facility] SLEEP MEDICINE CLINIC  NEW CONSULT VISIT NOTE    Virtual or Telephone Consent  An interactive audio and video telecommunication system which permits real time communications between the patient (at the originating site) and provider (at the distant site) was utilized to provide this telehealth service.   The patient was informed about the telehealth clinical encounter including benefits to avoiding travel, limitations of the assessment, and billing for the service. In-office care may be recommended if needed. Telehealth sessions are not being recorded and personal health information is protected. All questions were answered and verbal consent from the patient (or guardian) was obtained to proceed.     Referred by: Veronica Sumner APRN-C*    HISTORY OF PRESENT ILLNESS     Patient ID: Roby Caldwell is a 44 y.o. male who presents to [facility] for a comprehensive sleep medicine evaluation with concerns of sleep apnea on BPAP with PAP intolerance.    Patient is here alone today.  To review, patient's medical history is notable for morbid obesity (BMI 49), HTN, T2DM, CAD, anxiety, depression, and BASILIO on BPAP    Patient was diagnosed with BASILIO in 2017 by [facility] and was using Dreamstation auto-BPAP and full face mask regularly almost every night with good PAP compliance about 80% since then. He was also seeing Dr. Judge for his BASILIO and was getting high mask leak and high residual AHI on all PAP downloads but no changes made on the BPAP settings throughout the years. After getting replacement machine from [employer] in 2022 or 2023, patient had not seen any sleep doctor until now and PAP compliance is much worse now as he is not using his CPAP much.  He states that he always forget to put on mask at bedtime and also forget to put back CPAP mask when he goes to bathroom in the middle of night. He initially got his recalled machine and supplies from [employer] and then later on, he was buying supplies online.   Patient denies machine problems, mask leak, air hunger, aerophagia, dry mouth, and skin irritation. Complains of dry mouth and nasal congestion.  The following are patient's perceived benefits of PAP: decreased snoring / choking / gasping, refreshing sleep, and decreased daytime sleepiness and/or fatigue.    SLEEP STUDY HISTORY (personally reviewed raw data such as interpretation report, data sheet, hypnogram, and titration table if available and applicable)  No available records on file. Had sleep study done more than 8 years ago in [place].    SLEEP-WAKE SCHEDULE  Bedtime: 10-11 PM daily  Subjective sleep latency: 10 minutes  Difficulty falling asleep: No  Number of awakenings: 2 times per night to go to bathroom   Nocturia: Yes  Falls back asleep right away  Final wake time: 6 AM on workdays, 7 AM on off-workdays  Out of bed time: 6 AM on workdays, 7 AM on off-workdays  Shift work: No  Naps: 3x per week for 1 hour  Feels rested after a nap: Yes (not using BPAP during naps)  Average sleep duration (excluding naps): 7 hours     SLEEP ENVIRONMENT  Sleep location: bed  Sleep status: sleeps alone  Preferred sleep position: back and side  TV in bedroom: yes but TV not turned on during bedtime  Room is dark: Yes  Room is quiet: Yes  Room is cool: Yes    SLEEP HABITS  Smoking: no  ETOH: no  Marijuana: no  Caffeine: 1 can pop 2x daily  Sleep aids: yes on melatonin prn    SLEEP ROS  Night symptoms: POSITIVE for snoring and witnessed apnea when not using PAP, nasal congestion , mouth breathing, and nocturia and NEGATIVE for wake up gasping and/or choking for air, night sweats during sleep, waking up with racing heart, heartburn or sour taste in mouth at night, and nocturnal cough  Morning symptoms: POSITIVE for unrefreshing sleep when not using PAP and morning dry mouth and NEGATIVE for morning headache and morning sore throat  Daytime symptoms POSITIVE for  "excessive daytime sleepiness and fatigue when not using PAP and NEGATIVE for trouble remembering things in daytime, trouble staying focused in daytime, irritability in daytime, and drowsy driving  Hypersomnia / narcolepsy symptoms: Patient denies symptoms of a hypersomnolence disorder such as sleep paralysis, sleep-related hallucinations, and cataplexy.   Parasomnia symptoms: Patient denies symptoms of parasomnia such as sleepwalking, sleeptalking, sleep-eating, acting out dreams, and nightmares.   Movements in sleep: Patient denies problematic movements in sleep such as seizures during sleep, frequent leg kicks / jerks while asleep, sleep-related bruxism, and waking up with bedsheets in disarray.    RLS screen: Patient denies RLS symptoms.    WEIGHT: lost 20 lbs in 8 months     Claustrophobia: No    REVIEW OF SYSTEMS     All other systems have been reviewed and are negative.    ALLERGIES     Allergies[1]    MEDICATIONS     Current Medications[2]    PAST HISTORIES     PERTINENT PAST MEDICAL HISTORY: See HPI    PERTINENT PAST SURGICAL HISTORY for Sleep Medicine:  non-contributory    PERTINENT FAMILY HISTORY for Sleep Medicine:  Patient denies family history of sleep apnea.    PERTINENT SOCIAL HISTORY:  He  reports that he has never smoked. He has never been exposed to tobacco smoke. He has never used smokeless tobacco. He reports that he does not drink alcohol and does not use drugs. He currently lives with parents and employed as  in [employer]    Active Problems, Allergy List, Medication List, and PMH/PSH/FH/Social Hx have been reviewed and reconciled in chart. No significant changes unless documented in the pertinent chart section. Updates made when necessary.     PHYSICAL EXAM     VITAL SIGNS: Ht 1.867 m (6' 1.5\")   Wt (!) 172 kg (380 lb)   BMI 49.46 kg/m²     NECK CIRCUMFERENCE: not measured    ESS: 5  YULIANA: 10    RESULTS/DATA     Iron (ug/dL)   Date Value   11/30/2023 46   12/27/2021 46   08/02/2021 60 "     % Saturation (%)   Date Value   11/30/2023 14 (L)     Iron Saturation (%)   Date Value   12/27/2021 14 (L)   08/02/2021 18 (L)     TIBC (ug/dL)   Date Value   11/30/2023 318   12/27/2021 335   08/02/2021 329     Ferritin (ug/L)   Date Value   08/02/2021 245   03/12/2021 208       CARBON DIOXIDE   Date Value Ref Range Status   05/16/2025 29 20 - 32 mmol/L Final     PAP Adherence  A PAP adherence data was obtained and reviewed personally today in clinic. (see scanned document in EPIC)  Machine: [employer] Dreamstation auto-BIPAP version 1.2  Settings: auto-BPAP at minimum EPAP 5 cm H2O, PS 0-8 cm H2O, and maximum IPAP 25 cm H2O  Date Range: 7/8/2025 to 8/6/2025  Days used: 13/30  >4 hrs usage: 0%  Average usage hours (days used): 2 hours 4 minutes  IPAP: 90th percentile 11.4, maximum 15  EPAP: 90th percentile 11, maximum 15  Leak: time in large leak 1 hour 1 minute  AHI: 45.3 (RERA index 1.0, OLAF 0.8, OAI 9.0, HI 35.5)    ASSESSMENT/PLAN     Roby Caldwell is a 44 y.o. male who is seen today in [facility] for the following problems:.     OBSTRUCTIVE SLEEP APNEA  - Now on auto-BPAP at minimum EPAP 5 cm H2O, PS 0-8 cm H2O, and maximum IPAP 25 cm H2O.  - Doing well with improved BASILIO symptoms when using BPAP.  - PAP adherence data reviewed today. Usage days 13/30, days> 4 hours at 0%, residual AHI 45.3.   - Also reviewed all the PAP adherence data and noted that patient consistently had high mask leak and high residual AHI but previous sleep doctor never attempted to change the settings on the old recalled machine. After getting replacement machine from [employer] in 2022 or 2023, patient had not seen any sleep doctor until now and PAP compliance is much worse now.    - Continue current machine settings. Advised patient to use machine every night all night.   - Renew PAP supplies. Order placed and sent to [facility].   - Recommend setting an alarm at bedtime to remind  himself to put on mask at bedtime. Also, advised patient to put the mask within reach at bedtime. Lastly, advised patient about quick disconnect method in order to remind himself to put back mask after waking up in the middle of night.   - If residual AHI still high on follow-up despite good PAP compliance, consider doing split night study to split at AHI 10 and start BPAP titration at EPAP 10 cm H2O and PS 4 cm H2O. Alternatively, may consider changing settings to minimum EPAP 10 cm H2O, PS 4-8 cm H2O, and maximum IPAP 25 cm H2O.   - Discussed sleep apnea in detail to include pathophysiology, risk factors, diagnostic options, treatment options, and cardiovascular / neurologic consequences of untreated BASILIO.   - Supportive management as follows: Lose weight. Stay off your back when sleeping. Avoid smoking, alcohol, and sedating medications if applicable. Don't drive when sleepy.    MORBID OBESITY  - Recommend weight loss with diet and exercise.  - Weight loss can help in long term management of BASILIO.  - Defer management to PCP.    HYPERTENSION  - Untreated  or inadequately treated sleep apnea can lead to new onset hypertension, resistant hypertension, or refractory hypertension.  - Continue anti-hypertensive medications per PCP.  - Supportive management: low salt DASH diet (less than 2000 mg sodium intake daily), moderate intensity aerobic exercise at least 30 minutes 5 days per week, reduce stress, quit smoking, limit alcohol, lose weight, and monitor BP once daily  - PCP following. Defer management to PCP.      Follow-up in 3 months    All of patient's questions were answered. He verbalizes understanding and agreement with my assessment and plan. Refer to after-visit-summary (AVS) for patient education and more details on sleep study preparation, troubleshooting issues with PAP usage, proper cleaning instructions of PAP supplies, and usual recommended replacement schedule for each of the PAP supplies.          [1]  No Known Allergies  [2]   Current Outpatient Medications   Medication Sig Dispense Refill    amLODIPine (Norvasc) 10 mg tablet Take 1 tablet (10 mg) by mouth once daily. 90 tablet 3    atorvastatin (Lipitor) 80 mg tablet Take 1 tablet (80 mg) by mouth once daily at bedtime. 90 tablet 3    cholecalciferol (Vitamin D-3) 50 mcg (2,000 unit) capsule Take 1 capsule (50 mcg) by mouth once daily.      escitalopram (Lexapro) 10 mg tablet Take 1 tablet (10 mg) by mouth once daily. 90 tablet 3    ezetimibe (Zetia) 10 mg tablet Take 1 tablet (10 mg) by mouth once daily. 90 tablet 3    ferrous sulfate 325 (65 Fe) MG tablet Take 1 tablet by mouth once daily.      fish oil concentrate (Omega-3) 120-180 mg capsule Take 1 capsule (1,000 mg) by mouth.      glipiZIDE XL (Glucotrol XL) 10 mg 24 hr tablet TAKE ONE TABLET BY MOUTH DAILY with breakfast and dinner 180 tablet 3    lisinopriL-hydrochlorothiazide 20-25 mg tablet Take 1 tablet by mouth once daily. 90 tablet 3    melatonin 3 mg capsule Take 3 mg by mouth.      metFORMIN  mg 24 hr tablet Take 2 tablets (1,000 mg) by mouth 2 times daily (morning and late afternoon). 360 tablet 3    tirzepatide (Mounjaro) 5 mg/0.5 mL pen injector Inject 5 mg under the skin 1 (one) time per week. 2 mL 0    blood-glucose meter misc Use as directed to check fasting blood glucose once daily 1 each 0    blood sugar diagnostic (Blood Glucose Test) Use as directed to check fasting blood glucose once daily 100 each 3    lancets misc Use as directed to check fasting blood glucose once daily 100 each 3     No current facility-administered medications for this visit.

## 2025-08-07 NOTE — PATIENT INSTRUCTIONS
"Below is a summary of our contact numbers, your treatment plan, patient education, important information, and sleep testing locations.    If you need to schedule, cancel, and reschedule appointment for sleep study, please call [phone] ([phone]), option 3. For canceling and rescheduling, kindly call 48 hours in advance.  If you need to schedule, cancel, and reschedule follow-up appointment with me, please call [phone]), option 2. You may also call [phone] (direct line for [facility]) or [phone] (direct line for [facility]). Alternatively, you can schedule, cancel, and reschedule clinic appointments in \"[facility]\"  If you have a medical question about your sleep issues and/or need medication refills, please call [phone] (direct line for Adult [facility]). Alternatively, you can also send a message directly to your provider through \"My Chart\" ([email or web address])  If you need general assistance (e.g. forms completed, general questions, or additional help scheduling, please call [phone] (direct line for [facility]) or  [phone] (direct line for [facility])    If a referral for a sleep study, CPAP machine, or another specialist was made, and you have not heard about scheduling within a week, please call [phone] (direct line for [facility]) or  [phone] (direct line for [facility])    If you are on CPAP, please bring your device's SD card and/or the device to each clinic appointment.   In case of problems with PAP machine or mask interface, please contact your DME (Durable Medical Equipment) company first for troubleshooting. [employer] is the company who provides you the machine and/or PAP supplies. Under Important Phone Numbers below, see the list of DME companies and their contact numbers.     TREATMENT PLAN     - Continue current machine settings for now. Please use your machine every night all night.   - Recommend " setting an alarm at bedtime to remind yourself to put on mask at bedtime. Also, put your mask within reach at bedtime. Lastly, please do quick disconnect method in order to remind yourself to put back mask after waking up in the middle of night.     Follow-up Appointment:   Follow-up in 3 months    PATIENT EDUCATION     OBSTRUCTIVE SLEEP APNEA (BASILIO) is a sleep disorder where your upper airway muscles relax during sleep and the airway intermittently and repetitively narrows and collapses leading to partially blocked airway (hypopnea) or completely blocked airway (apnea) which, in turn, can disrupt breathing in sleep, lower oxygen levels while you sleep and cause night time wakings. Because both apnea and hypopnea may cause higher carbon dioxide or low oxygen levels, untreated BASILIO can lead to heart arrhythmia, elevation of blood pressure, and make it harder for the body to consolidate memory and facilitate metabolism (leading to higher blood sugars at night). Frequent partial arousals occur during sleep resulting in sleep deprivation and daytime sleepiness. BASILIO is associated with an increased risk of cardiovascular disease, stroke, hypertension, and insulin resistance. Moreover, untreated BASILIO with excessive daytime sleepiness can increase the risk of motor vehicular accidents.    Below are conservative strategies for BASILIO regardless of BASILIO severity are:   Positional therapy - Avoid sleeping on your back.   Healthy diet and regular exercise to optimize weight is highly encouraged.   Avoid alcohol late in the evening and sedative-hypnotics as these substances can make sleep apnea worse.   Improve breathing through the nose with intranasal steroid spray, saline rinse, or antihistamines    Safety: Avoid driving vehicle and operating heavy equipment while sleepy. Drowsy driving may lead to life-threatening motor vehicle accidents. A person driving while sleepy is 5 times more likely to have an accident. If you feel sleepy,  pull over and take a short power nap (sleep for less than 30 minutes). Otherwise, ask somebody to drive you.    Treatment options for sleep apnea include weight management, positional therapy, Positive Airway Therapy (PAP) therapy, oral appliance therapy, hypoglossal nerve stimulator (Inspire) and select airway surgeries.    Maintaining your CPAP/BPAP device:    The humidification chamber (aka water tank or water chamber) needs to be filled with distilled water to prevent buildup of white deposits in the future. If you cannot find distilled water, you can use tap water but expect to have white deposits buildup seen after prolonged use with tap water. If you start seeing white deposits on the water chamber, you can clean it by filling it with equal parts of distilled white vinegar and water. Let the vinegar-water mixture sit for 2 hours, and then rinse it with running tap water. Clean with soap and water then let it dry.     You should try to keep your machine clean in order to work well. Here are some tips to clean PAP supplies / accessories:    Clean the humidification chamber (aka water tank) as well as your mask and tubing at least once a week with soap and water.   Alternatively, you can fill a sink or basin with warm water and add a little mild detergent, like Ivory dish soap. Gently wipe your supplies with the soapy water to free all the oils and dirt that may have collected. Once that's done, rinse these items with clean water until the soap is gone and let them air dry. You can hang your tubing over the curtain ian in your bathroom so that it dries.  The mask insert (part of the mask that has contact with your skin) needs to be cleaned with soap and water daily. Another option is to wipe them down with CPAP wipes or baby wipes.    You should replace your mask and tubing frequently in order to prevent bacteria buildup, machine damage, and mask seal issues. The older the mask and hose, the high likelihood that  there is bacteria buildup in it especially if they are not cleaned regularly. Dirty filters damage machines because build-up of dust and contaminants can cause machine to over-heat, and in time, damage the motor of machine. Cushions lose their seal over time as most masks are made of plastic and silicone while headgear is made of neoprene. These materials will break down with age and frequent use. Here is the recommended replacement schedule for PAP supplies / accessories:    Twice a month- disposable filters and cushions for nasal mask or nasal pillows.  Once a month- cushion for full face mask  Every 3 months- mask with headgear and PAP tubing (standard or heated hose)  Every 6 months- reusable filter, water chamber, and chin strap     Other useful information:    Some people do not put water in the tank while other people prefers to put water in the tank to prevent mouth dryness. Try to experiment to determine which is more comfortable for you.   In general, new machines have 2 years warranty on parts while health insurance allows you to have a new machine once every 5 years.     Common issues with PAP machine:    Mask gets dislodged when turning to the side: Consider getting a CPAP pillow or switching to a mask with hose on top.     Dry mouth:  Your machine has built-in humidifier that heats up the air to prevent dry mouth. It can be adjusted to your comfort. You can try that first and increase setting one level one night at a time to check which setting is comfortable and effective in lessening dry mouth. In some patients with heated hose, adjusting tube temperature to make air warmer can improve dry mouth. If dry mouth persists despite adjusting humidity or tube temperature setting, may apply OTC Biotene gel over the gums at bedtime.  If Biotene gel is not effective, consider trying XEROSTOM gel from [employer].  Also, eliminate or reduce dose of medications that can cause dry mouth if possible. Lastly, may  "try getting a separate room humidifier machine.    Airleaks: Please call DME as they may need to adjust your mask or refit you with a different kind or different size of mask. In addition, you can ask DME for tips on getting a good mask seal and mask fit.     Difficulty tolerating the mask: Contact your DME to try a different kind of mask and/or call office to get a referral to Sleep Psychologist for CPAP desensitization. CPAP desensitization technique is a set of strategies that helps patient cope with claustrophobia and anxiety related to wearing mask. Alternatively, we can do a daytime mini-sleep study called PAP-nap trial wherein you will try on different kinds of mask and the sleep technician will try different pressure settings on CPAP and BPAP machines to see which specific pressure is tolerable and comfortable for you.     Water droplets or moisture within the hose and/or mask: This is called rain-out and it is caused by condensation of too much heated humidity on the cooler walls of the hose. If you have rain-out, turn down humidity settings or get a heated hose. If you already have a heated hose, turn up the \"tube temperature\" of the heated hose. Alternatively, if you don't want to get a heated hose or warmer air, may wrap the CPAP hose with stockings to keep it somewhat warm. Also, you need to place the machine on the floor and lower the hose so that water won't travel upward towards your mask.     PAP desensitization techniques: If you have concerns about something being on your face at night, you can start by getting used to it before trying to sleep with it as follows:      Sit in a comfortable chair or bed. Connect the mask and hose to the CPAP/BPAP machine. Hold the mask on your face (without straps on) and turn on the machine. Practice breathing with the mask on while awake sitting and watching television, reading, or performing a sedentary activity during the day for 5-10 minutes and then take it " off.  If tolerated, try again and gradually build up to longer periods of time. If not tolerated, try and try again until it is more comfortable as you become more desensitized. If you are able to use it for at least 20-30 minutes, move unto the next step.     Sit in a comfortable chair or bed. Connect the mask and hose to the CPAP/BPAP machine. Strap the mask on your head and turn on the machine. Practice breathing with the mask and headgear on while awake sitting and watching television, reading, or performing a sedentary activity. Start with 5-10 minutes and gradually increasing time until you can wear it comfortably for at least 20-30 minutes, then move to the next step.    Take a shorter daytime nap with machine turned on while you are in a reclined position in bed, sofa, or recliner. Start with 5-10 minute nap and gradually increase up to 30 minutes. It is not important whether you fall asleep or not. The goal is to rest comfortably with PAP machine on.     Reintroduce PAP machine into nighttime sleep. You can begin using it a portion of the night and gradually increase up to entire night.     Proceed from one step to the next only when you are completely comfortable. If you feel any anxiety or discomfort, return to the previous step, then proceed again when comfortable.    Expect to “work” with your CPAP/BIPAP unit. It is important to try to relax when beginning CPAP/BIPAP therapy. Inhalation and exhalation should occur through the nose only. If you are unable to consistently breathe this way, do not panic or lose hope. There are other types of masks which allow you to breathe through your nose and/or your mouth. Also, in some patients, using intranasal steroid spray (e.g. Flonase or Nasocort or Fluticasone) 1 hour before bedtime and/or before putting on CPAP mask can help tolerate breathing through the mask.    Don't give up after a few attempts--some patients adjust quickly, while some patients need 3-4  weeks (or sometimes even longer) to be accustomed to CPAP therapy.  Contact your sleep medicine specialist if you have a significant change in weight since this may affect your pressure.    You can also go to the following EDUCATION WEBSITES for further information:   American Academy of Sleep Medicine http://sleepeducation.org  National Sleep Foundation: https://sleepfoundation.org  American Sleep Apnea Association: https://www.sleepapnea.org (for patients with sleep apnea)  Narcolepsy Network: https://www.narcolepsynetwork.org (for patients with narcolepsy)  [employer]: https://www.KrÃƒÂ¶hn[employer] Infotecscolepsy.org (for patients with narcolepsy)  Hypersomnia Foundation: https://www.hypersomniafoundation.org (for patients with idiopathic hypersomnia)  RLS foundation: https://www.rls.org (for patients with restless leg syndrome)    IMPORTANT INFORMATION     Call 911 for medical emergencies.  Our offices are generally open from Monday-Friday, 8 am - 5 pm.   There are no supporting services by either the sleep doctors or their staff on weekends and Holidays, or after 5 PM on weekdays.   If you need to get in touch with me, you may call the direct line for Adult Sleep Nurses or the direct line of any of my 2 offices ([place] or [place]). Alternatively, you can also use [facility].    PRESCRIPTIONS     We require 7 days advanced notice for prescription refills. If we do not receive the request in this time, we cannot guarantee that your medication will be refilled in time.    IMPORTANT PHONE NUMBERS     Sleep Medicine Clinic Fax: [phone]  Appointments (for Pediatric Sleep Clinic): [phone] - option 1  Appointments (for Adult Sleep Clinic): [phone] - option 2  Appointments (For Sleep Studies): [phone] - option 3  Behavioral Sleep Medicine: [phone]  Sleep Surgery: [phone] (Dr. Mata or Dr. Garcia)  Myofunctional Therapy (ENT): [phone] for [place]; [phone] for [place];  [phone] for [place], [facility], [facility]; [phone] for [place]; [phone] for [place]; [phone] option 1 for [place]  Nutrition Service: [phone]  Weight management clinic with endocrinology: [phone] or [phone] (Dr. Muhammad)  Bariatric Surgery: [phone]   [facility]: [phone] (offers holistic approaches to weight management)  ENT (Otolaryngology): [phone]  Neurology: [phone]  Psychiatry: [phone]  Pulmonary Function Testing (PFT) Center: [phone]  Pulmonary Medicine: [phone]  [employer] (DME): [phone]  [facility] [employer] (DME): [phone] or [phone] (formerly known as [facility])  [facility] (DME): [phone]  [facility] (DME): [phone]  [facility] (DME): [phone] ([place]), [phone] ([place])    OUR SLEEP TESTING LOCATIONS     Our team will contact you to schedule your sleep study, however, you can contact us as follows:  Main Phone Line (scheduling only): [phone] ([phone]), option 3  Adult and Pediatric Locations  [facility] (6 years and older): [facility] - 4th floor ([address] [place] [address]) After hours line: [phone]  [facility] at [facility] (Main campus: All ages): [facility], 6th floor. After hours line: [phone]   [place] (4-5 years and older; younger considered on case-by-case basis): [address]; [address], [address]. Scheduling  After hours line: [phone]   [place] (6 years and older): [address]; [address]; [address]   [place] (6 years and older): [address] [facility] [address], [address]  After hours line: [phone]   [place] (6 years and older) in [place]: [address], 2nd floor  After hours line: [phone]   [place] (13 years and older): [address]  After  hours line: [phone]    [place] (6 years and older): [address] [facility], 2nd floor   [place] (13 years and older): [address] [facility]; 4th floor  After hours line: [phone]  [facility] (6 years and older) at [place]: [address] [facility] [place] 2]  After hours line: [phone]     Once again, thank you for coming to the [facility] today.  Here at [facility], we wish you a restful sleep!

## 2025-08-28 DIAGNOSIS — E11.65 TYPE 2 DIABETES MELLITUS WITH HYPERGLYCEMIA, WITHOUT LONG-TERM CURRENT USE OF INSULIN: ICD-10-CM

## 2025-08-28 DIAGNOSIS — G47.33 OBSTRUCTIVE SLEEP APNEA ON CPAP: ICD-10-CM

## 2025-08-28 DIAGNOSIS — I25.10 CORONARY ARTERY DISEASE INVOLVING NATIVE CORONARY ARTERY OF NATIVE HEART, UNSPECIFIED WHETHER ANGINA PRESENT: ICD-10-CM

## 2025-08-28 DIAGNOSIS — E66.01 MORBID OBESITY WITH BODY MASS INDEX (BMI) OF 40.0 OR HIGHER (MULTI): ICD-10-CM

## 2025-08-28 PROCEDURE — RXMED WILLOW AMBULATORY MEDICATION CHARGE

## 2025-08-28 RX ORDER — TIRZEPATIDE 5 MG/.5ML
5 INJECTION, SOLUTION SUBCUTANEOUS WEEKLY
Qty: 2 ML | Refills: 0 | Status: SHIPPED | OUTPATIENT
Start: 2025-08-28

## 2025-08-30 ENCOUNTER — PHARMACY VISIT (OUTPATIENT)
Dept: PHARMACY | Facility: CLINIC | Age: 45
End: 2025-08-30
Payer: MEDICARE

## 2025-09-04 ENCOUNTER — APPOINTMENT (OUTPATIENT)
Dept: PRIMARY CARE | Facility: CLINIC | Age: 45
End: 2025-09-04
Payer: COMMERCIAL

## 2025-09-04 PROBLEM — U09.9 POST COVID-19 CONDITION, UNSPECIFIED: Status: ACTIVE | Noted: 2024-03-09

## 2025-09-11 ENCOUNTER — APPOINTMENT (OUTPATIENT)
Dept: PHARMACY | Facility: HOSPITAL | Age: 45
End: 2025-09-11
Payer: COMMERCIAL

## 2026-02-10 ENCOUNTER — APPOINTMENT (OUTPATIENT)
Dept: CARDIOLOGY | Facility: CLINIC | Age: 46
End: 2026-02-10
Payer: COMMERCIAL

## 2026-03-05 ENCOUNTER — APPOINTMENT (OUTPATIENT)
Dept: PRIMARY CARE | Facility: CLINIC | Age: 46
End: 2026-03-05
Payer: COMMERCIAL